# Patient Record
Sex: FEMALE | Race: WHITE
[De-identification: names, ages, dates, MRNs, and addresses within clinical notes are randomized per-mention and may not be internally consistent; named-entity substitution may affect disease eponyms.]

---

## 2018-02-07 ENCOUNTER — HOSPITAL ENCOUNTER (EMERGENCY)
Dept: HOSPITAL 75 - ER | Age: 33
Discharge: HOME | End: 2018-02-07
Payer: SELF-PAY

## 2018-02-07 VITALS — HEIGHT: 61 IN | BODY MASS INDEX: 33.04 KG/M2 | WEIGHT: 175 LBS

## 2018-02-07 VITALS — DIASTOLIC BLOOD PRESSURE: 80 MMHG | SYSTOLIC BLOOD PRESSURE: 124 MMHG

## 2018-02-07 DIAGNOSIS — Z87.442: ICD-10-CM

## 2018-02-07 DIAGNOSIS — H53.123: ICD-10-CM

## 2018-02-07 DIAGNOSIS — D64.9: ICD-10-CM

## 2018-02-07 DIAGNOSIS — Z88.1: ICD-10-CM

## 2018-02-07 DIAGNOSIS — R51: Primary | ICD-10-CM

## 2018-02-07 DIAGNOSIS — Z79.84: ICD-10-CM

## 2018-02-07 DIAGNOSIS — Z87.448: ICD-10-CM

## 2018-02-07 LAB
ALBUMIN SERPL-MCNC: 3.6 GM/DL (ref 3.2–4.5)
ALP SERPL-CCNC: 69 U/L (ref 40–136)
ALT SERPL-CCNC: 14 U/L (ref 0–55)
APTT PPP: YELLOW S
BACTERIA #/AREA URNS HPF: NEGATIVE /HPF
BARBITURATES UR QL: NEGATIVE
BASOPHILS # BLD AUTO: 0 10^3/UL (ref 0–0.1)
BASOPHILS NFR BLD AUTO: 0 % (ref 0–10)
BENZODIAZ UR QL SCN: NEGATIVE
BILIRUB SERPL-MCNC: 0.2 MG/DL (ref 0.1–1)
BILIRUB UR QL STRIP: NEGATIVE
BUN/CREAT SERPL: 20
CALCIUM SERPL-MCNC: 8.4 MG/DL (ref 8.5–10.1)
CHLORIDE SERPL-SCNC: 108 MMOL/L (ref 98–107)
CO2 SERPL-SCNC: 18 MMOL/L (ref 21–32)
COCAINE UR QL: NEGATIVE
CREAT SERPL-MCNC: 0.69 MG/DL (ref 0.6–1.3)
EOSINOPHIL # BLD AUTO: 0.1 10^3/UL (ref 0–0.3)
EOSINOPHIL NFR BLD AUTO: 1 % (ref 0–10)
ERYTHROCYTE [DISTWIDTH] IN BLOOD BY AUTOMATED COUNT: 14.2 % (ref 10–14.5)
ERYTHROCYTE [SEDIMENTATION RATE] IN BLOOD: 36 MM/HR (ref 0–20)
FIBRINOGEN PPP-MCNC: (no result) MG/DL
GFR SERPLBLD BASED ON 1.73 SQ M-ARVRAT: > 60 ML/MIN
GLUCOSE SERPL-MCNC: 113 MG/DL (ref 70–105)
GLUCOSE UR STRIP-MCNC: NEGATIVE MG/DL
HCT VFR BLD CALC: 34 % (ref 35–52)
HGB BLD-MCNC: 11.1 G/DL (ref 11.5–16)
KETONES UR QL STRIP: NEGATIVE
LEUKOCYTE ESTERASE UR QL STRIP: (no result)
LYMPHOCYTES # BLD AUTO: 1.3 X 10^3 (ref 1–4)
LYMPHOCYTES NFR BLD AUTO: 15 % (ref 12–44)
MANUAL DIFFERENTIAL PERFORMED BLD QL: NO
MCH RBC QN AUTO: 27 PG (ref 25–34)
MCHC RBC AUTO-ENTMCNC: 33 G/DL (ref 32–36)
MCV RBC AUTO: 82 FL (ref 80–99)
METHADONE UR QL SCN: NEGATIVE
METHAMPHETAMINE SCREEN URINE S: NEGATIVE
MONOCYTES # BLD AUTO: 0.2 X 10^3 (ref 0–1)
MONOCYTES NFR BLD AUTO: 2 % (ref 0–12)
NEUTROPHILS # BLD AUTO: 7.2 X 10^3 (ref 1.8–7.8)
NEUTROPHILS NFR BLD AUTO: 82 % (ref 42–75)
NITRITE UR QL STRIP: NEGATIVE
OPIATES UR QL SCN: NEGATIVE
OXYCODONE UR QL: NEGATIVE
PH UR STRIP: 6.5 [PH] (ref 5–9)
PLATELET # BLD: 259 10^3/UL (ref 130–400)
PMV BLD AUTO: 11.1 FL (ref 7.4–10.4)
POTASSIUM SERPL-SCNC: 3.8 MMOL/L (ref 3.6–5)
PROPOXYPH UR QL: NEGATIVE
PROT SERPL-MCNC: 7.2 GM/DL (ref 6.4–8.2)
PROT UR QL STRIP: (no result)
RBC # BLD AUTO: 4.09 10^6/UL (ref 4.35–5.85)
RBC #/AREA URNS HPF: >100 /HPF
SODIUM SERPL-SCNC: 140 MMOL/L (ref 135–145)
SP GR UR STRIP: 1.02 (ref 1.02–1.02)
SQUAMOUS #/AREA URNS HPF: (no result) /HPF
TRICYCLICS UR QL SCN: NEGATIVE
UROBILINOGEN UR-MCNC: 1 MG/DL
WBC # BLD AUTO: 8.9 10^3/UL (ref 4.3–11)
WBC #/AREA URNS HPF: (no result) /HPF

## 2018-02-07 PROCEDURE — 36415 COLL VENOUS BLD VENIPUNCTURE: CPT

## 2018-02-07 PROCEDURE — 85652 RBC SED RATE AUTOMATED: CPT

## 2018-02-07 PROCEDURE — 80320 DRUG SCREEN QUANTALCOHOLS: CPT

## 2018-02-07 PROCEDURE — 80053 COMPREHEN METABOLIC PANEL: CPT

## 2018-02-07 PROCEDURE — 70450 CT HEAD/BRAIN W/O DYE: CPT

## 2018-02-07 PROCEDURE — 80306 DRUG TEST PRSMV INSTRMNT: CPT

## 2018-02-07 PROCEDURE — 85025 COMPLETE CBC W/AUTO DIFF WBC: CPT

## 2018-02-07 PROCEDURE — 82962 GLUCOSE BLOOD TEST: CPT

## 2018-02-07 PROCEDURE — 81000 URINALYSIS NONAUTO W/SCOPE: CPT

## 2018-02-07 PROCEDURE — 84703 CHORIONIC GONADOTROPIN ASSAY: CPT

## 2018-02-07 PROCEDURE — 86141 C-REACTIVE PROTEIN HS: CPT

## 2018-02-07 NOTE — ED HEADACHE
General


Chief Complaint:  Head/Cervical Problems


Stated Complaint:  LOSS OF VISION, COLD, SHAKING - PRE DIABETIC


Nursing Triage Note:  


ARRIVED VIA AMB TO ROOM 04.  STATES APPX 30 MINUTES AGO HER VISION TURNED TO 


WHITE FOR APPX 5-7 MINUTES AND THEN SHE DEVELOPED A HEADACHE.


Nursing Sepsis Screen:  No Definite Risk


Source:  patient, family


Exam Limitations:  language barrier (speak Chinese. Interpretation by family 

member.)





History of Present Illness


Date Seen by Provider:  2018


Time Seen by Provider:  16:23


Initial Comments


Patient presents to ER by private conveyance with chief complaint that about an 

hour prior to arrival she had a 15 minute long severe headache that came on 

suddenly started in her left face behind eye and radiated around the parietal 

scalp to the occiput. She says her vision began to go white and all she could 

see was just shadows and this was in both eyes she was practically blind. She 

was trying to drive home after taking her kids up from school and had to pull 

over until someone came to help her. About 15 minutes later without any 

medicines it went away on its own. She does have a history of migraines that 

are typically frontal and behind both eyes throbbing, constant and will go away 

after a couple doses of Excedrin migraine. She had one other episode of this 

about a month ago that she presented to her primary care physician's office for 

and they told her that she needed to go to the ER but as she left the clinic 

the headache went away in about 15-30 minutes so she did not pursue ER 

management. She did not follow-up on this either. She has not had any cough, 

fevers, chills, dysuria, nausea, chest pain, shortness of breath.





Allergies and Home Medications


Allergies


Coded Allergies:  


     NKANo Known Allergies (Verified  Allergy, Unknown, 06)





Home Medications


Metformin HCl 500 Mg Tablet, (Reported)


Norgestimate-Ethinyl Estradiol 1 Each Tablet, (Reported)





Constitutional:  No chills, No diaphoresis


Eyes:  See HPI, Blindness, Blurred Vision, Denies Drainage, Denies Foreign Body 

Sensation, Denies Pain, Denies Photophobia


Ears, Nose, Mouth, Throat:  denies ear pain, denies ear discharge


Respiratory:  No cough, No phlegm, No short of breath


Cardiovascular:  No Hx of Intervention, No palpitations


Gastrointestinal:  No constipation, No diarrhea, nausea (initially)


Genitourinary:  No discharge, No dysuria


Pregnant:  No (on her period presently)


LMP:  2018


Musculoskeletal:  No back pain, No joint pain





Past Medical-Social-Family Hx


Patient Social History


Alcohol Use:  Denies Use


Recreational Drug Use:  No


Smoking Status:  Never a Smoker


Recent Foreign Travel:  No


Contact w/Someone Who Travel:  No


Recent Infectious Disease Expo:  No


Recent Hopitalizations:  Yes





Immunizations Up To Date


Tetanus Booster (TDap):  Less than 5yrs


Date of Influenza Vaccine:  Dec 5, 2013





Surgeries


History of Surgeries:  No





Respiratory


History of Respiratory Disorde:  No





Cardiovascular


History of Cardiac Disorders:  No





Neurological


History of Neurological Disord:  No





Reproductive System


Hx Reproductive Disorders:  No


Sexually Transmitted Disease:  No


HIV/AIDS:  No


Female Reproductive Disorders:  Denies





Genitourinary


History of Genitourinary Disor:  Yes


Genitourinary Disorders:  Kidney Infection, Kidney Stones





Gastrointestinal


History of Gastrointestinal Di:  No





Musculoskeletal


History of Musculoskeletal Dis:  No





Endocrine


History of Endocrine Disorders:  Yes (PRE DIABETIC)





HEENT


Loss of Vision:  Denies


Hearing Impairment:  Denies





Cancer


History of Cancer:  No





Psychosocial


History of Psychiatric Problem:  No





Integumentary


History of Skin or Integumenta:  No





Blood Transfusions


History of Blood Disorders:  Yes (ANEMIA WITH Previous pregnancy)


Adverse Reaction to a Blood Tr:  No





Family Medical History


Significant Family History:  No Pertinent Family Hx





Physical Exam


Vital Signs





Vital Signs - First Documented








 18





 15:49


 


Temp 97.5


 


Pulse 97


 


Resp 18


 


B/P (MAP) 114/79 (91)


 


Pulse Ox 99





Capillary Refill : Less Than 3 Seconds


General Appearance:  WD/WN, no apparent distress


HEENT:  PERRL/EOMI, TMs normal, pharynx normal


Neck:  non-tender, full range of motion, supple, normal inspection


Cardiovascular:  normal peripheral pulses, regular rate, rhythm, no edema


Respiratory:  chest non-tender, lungs clear, normal breath sounds


Gastrointestinal:  normal bowel sounds, non tender, soft


Back:  normal inspection, no vertebral tenderness


Extremities:  normal inspection, no pedal edema, normal capillary refill


Psychiatric:  alert, oriented x 3


Crainal Nerves:  normal hearing, normal speech, PERRL


Coordination/Gait:  normal finger to nose, normal gait


Motor/Sensory:  no motor deficit, no sensory deficit, no pronator drift


Skin:  normal color, warm/dry


Lymphatic:  no adenopathy





Progress/Results/Core Measures


Results/Orders


Lab Results





Laboratory Tests








Test


  18


15:59 18


16:30 18


16:41 Range/Units


 


 


Glucometer 107      MG/DL


 


Urine Color  YELLOW    


 


Urine Clarity  VERY CLOUDY H   


 


Urine pH  6.5   5-9  


 


Urine Specific Gravity  1.020   1.016-1.022  


 


Urine Protein  3+ H  NEGATIVE  


 


Urine Glucose (UA)  NEGATIVE   NEGATIVE  


 


Urine Ketones  NEGATIVE   NEGATIVE  


 


Urine Nitrite  NEGATIVE   NEGATIVE  


 


Urine Bilirubin  NEGATIVE   NEGATIVE  


 


Urine Urobilinogen  1   NORMAL  MG/DL


 


Urine Leukocyte Esterase  1+ H  NEGATIVE  


 


Urine RBC (Auto)  5+ H  NEGATIVE  


 


Urine RBC  >100 H   /HPF


 


Urine WBC  0-2    /HPF


 


Urine Squamous Epithelial


Cells 


  0-2 


  


   /HPF


 


 


Urine Crystals  NONE    /LPF


 


Urine Bacteria  NEGATIVE    /HPF


 


Urine Casts  NONE    /LPF


 


Urine Mucus  SMALL H   /LPF


 


Urine Culture Indicated  NO    


 


Urine Opiates Screen  NEGATIVE   NEGATIVE  


 


Urine Oxycodone Screen  NEGATIVE   NEGATIVE  


 


Urine Methadone Screen  NEGATIVE   NEGATIVE  


 


Urine Propoxyphene Screen  NEGATIVE   NEGATIVE  


 


Urine Barbiturates Screen  NEGATIVE   NEGATIVE  


 


Ur Tricyclic Antidepressants


Screen 


  NEGATIVE 


  


  NEGATIVE  


 


 


Urine Phencyclidine Screen  NEGATIVE   NEGATIVE  


 


Urine Amphetamines Screen  NEGATIVE   NEGATIVE  


 


Urine Methamphetamines Screen  NEGATIVE   NEGATIVE  


 


Urine Benzodiazepines Screen  NEGATIVE   NEGATIVE  


 


Urine Cocaine Screen  NEGATIVE   NEGATIVE  


 


Urine Cannabinoids Screen  NEGATIVE   NEGATIVE  


 


White Blood Count


  


  


  8.9 


  4.3-11.0


10^3/uL


 


Red Blood Count


  


  


  4.09 L


  4.35-5.85


10^6/uL


 


Hemoglobin   11.1 L 11.5-16.0  G/DL


 


Hematocrit   34 L 35-52  %


 


Mean Corpuscular Volume   82  80-99  FL


 


Mean Corpuscular Hemoglobin   27  25-34  PG


 


Mean Corpuscular Hemoglobin


Concent 


  


  33 


  32-36  G/DL


 


 


Red Cell Distribution Width   14.2  10.0-14.5  %


 


Platelet Count


  


  


  259 


  130-400


10^3/uL


 


Mean Platelet Volume   11.1 H 7.4-10.4  FL


 


Neutrophils (%) (Auto)   82 H 42-75  %


 


Lymphocytes (%) (Auto)   15  12-44  %


 


Monocytes (%) (Auto)   2  0-12  %


 


Eosinophils (%) (Auto)   1  0-10  %


 


Basophils (%) (Auto)   0  0-10  %


 


Neutrophils # (Auto)   7.2  1.8-7.8  X 10^3


 


Lymphocytes # (Auto)   1.3  1.0-4.0  X 10^3


 


Monocytes # (Auto)   0.2  0.0-1.0  X 10^3


 


Eosinophils # (Auto)


  


  


  0.1 


  0.0-0.3


10^3/uL


 


Basophils # (Auto)


  


  


  0.0 


  0.0-0.1


10^3/uL


 


Sodium Level   140  135-145  MMOL/L


 


Potassium Level   3.8  3.6-5.0  MMOL/L


 


Chloride Level   108 H   MMOL/L


 


Carbon Dioxide Level   18 L 21-32  MMOL/L


 


Anion Gap   14  5-14  MMOL/L


 


Blood Urea Nitrogen   14  7-18  MG/DL


 


Creatinine


  


  


  0.69 


  0.60-1.30


MG/DL


 


Estimat Glomerular Filtration


Rate 


  


  > 60 


   


 


 


BUN/Creatinine Ratio   20   


 


Glucose Level   113 H   MG/DL


 


Calcium Level   8.4 L 8.5-10.1  MG/DL


 


Total Bilirubin   0.2  0.1-1.0  MG/DL


 


Aspartate Amino Transf


(AST/SGOT) 


  


  18 


  5-34  U/L


 


 


Alanine Aminotransferase


(ALT/SGPT) 


  


  14 


  0-55  U/L


 


 


Alkaline Phosphatase   69    U/L


 


C-Reactive Protein High


Sensitivity 


  


  0.96 H


  0.00-0.50


MG/DL


 


Total Protein   7.2  6.4-8.2  GM/DL


 


Albumin   3.6  3.2-4.5  GM/DL








My Orders





Orders - ITALO BECKMAN


Ct Head Wo (18 16:30)


Saline Lock/Iv-Start (18 16:30)


Alcohol (18 16:30)


Cbc With Automated Diff (18 16:30)


Comprehensive Metabolic Panel (18 16:30)


Hs C Reactive Protein (18 16:30)


Drug Screen Stat (Urine) (18 16:30)


Ua Culture If Indicated (18 16:30)


Erythrocyte Sedimentation Rate (18 16:30)





Vital Signs/I&O





Vital Sign - Last 12Hours








 18





 15:49


 


Temp 97.5


 


Pulse 97


 


Resp 18


 


B/P (MAP) 114/79 (91)


 


Pulse Ox 99














Blood Pressure Mean:  91








Progress Note #1:  


   Time:  16:42


Progress Note


No headaches or other neurologic symptoms now but this is a change in her 

headache pattern. Plan to scan her head with a CT noncontrast. Discussed with 

radiologist in [place] for optimal imaging study and they recommended a CT 

noncontrast and if further workup is necessary an MRI would be appropriate. We'

ll get some blood and urine looking for any evidence of inflammation to include 

a CRP/ESR. If we don't find anything to act on today she will need to follow-up 

with her primary care physician for further workup. GCA?


Progress Note #2:  


   Time:  17:15


Progress Note


Other than a mild normochromic normocytic anemia the patient has unremarkable 

labs and urine. Red blood cells seen are due to her being on her period. CT is 

unremarkable. She can follow up outpatient for further evaluation to possibly 

include MRI for a atypical migraine presentation.





Diagnostic Imaging





   Diagonstic Imaging:  CT


   Plain Films/CT/US/NM/MRI:  head (noncontrast)


Comments


 VIA [facility], [facility].


 [place], [place]





NAME:   BEVERLY SAWYER


MED REC#:   [id number]


ACCOUNT#:   [id number]


PT STATUS:   REG ER


:   1985


PHYSICIAN:   ITALO BECKMAN MD


ADMIT DATE:   18/ER


 ***Draft***


Date of Exam:18





CT HEAD WO








Clinical indication: A patient with headaches and loss of vision.





Exam: Axial CT scan of the brain performed without IV contrast.





Comparison: None.





Findings:


There is no evidence of acute cerebral infarct, intracranial


hemorrhage, or gross mass effect. 





There is normal gray-white matter distinction. The brain


parenchymal volume appears appropriate for patient's age. There


is no significant midline shift or herniation. 





 There is no evidence of hydrocephalus. The basal cisterns are


unremarkable. The skull, extracranial soft tissue, and orbits are


unremarkable. The paranasal sinuses are unremarkable.





Impression:


Unremarkable CT scan of the brain.





  Dictated on workstation # [id number]








Dict:   18 1652


Trans:   18 1655


[facility] [id number]





Interpreted by:     LISBETH MANN MD


Electronically signed by:


   Reviewed:  Reviewed by Me





Departure


Impression


Impression:  


 Primary Impression:  


 Headache


 Qualified Codes:  R51 - Headache


 Additional Impressions:  


 Transient blindness of both eyes


 Normocytic anemia


Disposition:  01 HOME, SELF-CARE


Condition:  Stable





Departure-Patient Inst.


Decision time for Depature:  17:34


Referrals:  


[facility] (PCP/Family)


Primary Care Physician


Patient Instructions:  Migraine Headache (DC)





Add. Discharge Instructions:  


Your symptoms may be related to your migraine headache pattern. If the pattern 

changes and is accompanied by slurred speech, facial droop, inability to walk 

or weakness you should return to the ER immediately. However if your symptoms 

remain the same please start a diary of your headache symptoms to include 

things are doing prior to the headache as well as along the headache lasted and 

what you used to break the headache. Plan to follow up with your primary care 

physician in the next 1-2 weeks. You may benefit from a referral to a 

neurologist if initial workup with your primary care physician is not fruitful. 

All discharge instructions reviewed with patient and/or family. Voiced 

understanding.





Copy


Copies To 1:   NEETA KNOWLES TITUS J 2018 16:41

## 2018-02-07 NOTE — DIAGNOSTIC IMAGING REPORT
Clinical indication: A patient with headaches and loss of vision.



Exam: Axial CT scan of the brain performed without IV contrast.



Comparison: None.



Findings:

There is no evidence of acute cerebral infarct, intracranial

hemorrhage, or gross mass effect. 



There is normal gray-white matter distinction. The brain

parenchymal volume appears appropriate for patient's age. There

is no significant midline shift or herniation. 



 There is no evidence of hydrocephalus. The basal cisterns are

unremarkable. The skull, extracranial soft tissue, and orbits are

unremarkable. The paranasal sinuses are unremarkable.



Impression:

Unremarkable CT scan of the brain.



Dictated by: 



  Dictated on workstation # [id number]

## 2018-07-19 ENCOUNTER — HOSPITAL ENCOUNTER (OUTPATIENT)
Dept: HOSPITAL 75 - RAD | Age: 33
End: 2018-07-19
Attending: PHYSICIAN ASSISTANT
Payer: COMMERCIAL

## 2018-07-19 ENCOUNTER — HOSPITAL ENCOUNTER (OUTPATIENT)
Dept: HOSPITAL 75 - SDC | Age: 33
LOS: 1 days | Discharge: HOME | End: 2018-07-20
Attending: SURGERY
Payer: COMMERCIAL

## 2018-07-19 VITALS
DIASTOLIC BLOOD PRESSURE: 66 MMHG | BODY MASS INDEX: 33.99 KG/M2 | HEIGHT: 61 IN | SYSTOLIC BLOOD PRESSURE: 108 MMHG | WEIGHT: 180 LBS

## 2018-07-19 VITALS — SYSTOLIC BLOOD PRESSURE: 104 MMHG | DIASTOLIC BLOOD PRESSURE: 59 MMHG

## 2018-07-19 DIAGNOSIS — K35.80: Primary | ICD-10-CM

## 2018-07-19 DIAGNOSIS — K38.1: Primary | ICD-10-CM

## 2018-07-19 DIAGNOSIS — Z79.84: ICD-10-CM

## 2018-07-19 DIAGNOSIS — E11.9: ICD-10-CM

## 2018-07-19 LAB
BASOPHILS # BLD AUTO: 0 10^3/UL (ref 0–0.1)
BASOPHILS NFR BLD AUTO: 0 % (ref 0–10)
BUN/CREAT SERPL: 15
CALCIUM SERPL-MCNC: 9.4 MG/DL (ref 8.5–10.1)
CHLORIDE SERPL-SCNC: 109 MMOL/L (ref 98–107)
CO2 SERPL-SCNC: 21 MMOL/L (ref 21–32)
CREAT SERPL-MCNC: 0.65 MG/DL (ref 0.6–1.3)
EOSINOPHIL # BLD AUTO: 0.2 10^3/UL (ref 0–0.3)
EOSINOPHIL NFR BLD AUTO: 2 % (ref 0–10)
ERYTHROCYTE [DISTWIDTH] IN BLOOD BY AUTOMATED COUNT: 14.3 % (ref 10–14.5)
GFR SERPLBLD BASED ON 1.73 SQ M-ARVRAT: > 60 ML/MIN
GLUCOSE SERPL-MCNC: 106 MG/DL (ref 70–105)
HCT VFR BLD CALC: 34 % (ref 35–52)
HGB BLD-MCNC: 11.1 G/DL (ref 11.5–16)
LYMPHOCYTES # BLD AUTO: 2 X 10^3 (ref 1–4)
LYMPHOCYTES NFR BLD AUTO: 16 % (ref 12–44)
MANUAL DIFFERENTIAL PERFORMED BLD QL: NO
MCH RBC QN AUTO: 27 PG (ref 25–34)
MCHC RBC AUTO-ENTMCNC: 33 G/DL (ref 32–36)
MCV RBC AUTO: 81 FL (ref 80–99)
MONOCYTES # BLD AUTO: 0.7 X 10^3 (ref 0–1)
MONOCYTES NFR BLD AUTO: 6 % (ref 0–12)
NEUTROPHILS # BLD AUTO: 9.2 X 10^3 (ref 1.8–7.8)
NEUTROPHILS NFR BLD AUTO: 76 % (ref 42–75)
PLATELET # BLD: 297 10^3/UL (ref 130–400)
PMV BLD AUTO: 10.8 FL (ref 7.4–10.4)
POTASSIUM SERPL-SCNC: 4 MMOL/L (ref 3.6–5)
RBC # BLD AUTO: 4.15 10^6/UL (ref 4.35–5.85)
SODIUM SERPL-SCNC: 139 MMOL/L (ref 135–145)
WBC # BLD AUTO: 12.2 10^3/UL (ref 4.3–11)

## 2018-07-19 PROCEDURE — 74177 CT ABD & PELVIS W/CONTRAST: CPT

## 2018-07-19 PROCEDURE — 36415 COLL VENOUS BLD VENIPUNCTURE: CPT

## 2018-07-19 PROCEDURE — 85025 COMPLETE CBC W/AUTO DIFF WBC: CPT

## 2018-07-19 PROCEDURE — 87081 CULTURE SCREEN ONLY: CPT

## 2018-07-19 PROCEDURE — 83036 HEMOGLOBIN GLYCOSYLATED A1C: CPT

## 2018-07-19 PROCEDURE — 94664 DEMO&/EVAL PT USE INHALER: CPT

## 2018-07-19 PROCEDURE — 80048 BASIC METABOLIC PNL TOTAL CA: CPT

## 2018-07-19 PROCEDURE — 84703 CHORIONIC GONADOTROPIN ASSAY: CPT

## 2018-07-19 RX ADMIN — METRONIDAZOLE SCH MLS/HR: 5 INJECTION, SOLUTION INTRAVENOUS at 18:51

## 2018-07-19 RX ADMIN — CIPROFLOXACIN SCH MLS/HR: 2 INJECTION, SOLUTION INTRAVENOUS at 18:51

## 2018-07-19 RX ADMIN — SODIUM CHLORIDE, SODIUM LACTATE, POTASSIUM CHLORIDE, AND CALCIUM CHLORIDE SCH MLS/HR: 600; 310; 30; 20 INJECTION, SOLUTION INTRAVENOUS at 18:51

## 2018-07-19 NOTE — DIAGNOSTIC IMAGING REPORT
PROCEDURE: CT abdomen and pelvis with contrast, rule out

appendicitis.



TECHNIQUE: Multiple contiguous axial images were obtained through

the abdomen and pelvis after the administration of intravenous

contrast.



INDICATION: Right lower quadrant pain.



COMPARISON: Comparison is made with prior CT from 02/05/2014.



FINDINGS: The lung bases are clear. No discrete liver mass is

detected. The gallbladder is unremarkable. The pancreas and

spleen are unremarkable. No adrenal mass is identified. The

kidneys are unremarkable. Aorta is non-aneurysmal. There is

moderate inflammatory changes identified in the right lower

quadrant. The appendix is dilated and inflamed. There appears to

be an appendicolith within the appendix. No abscess formation or

bowel obstruction is seen. There is moderate stool throughout the

colon. No free fluid is seen. The bladder and uterus are

unremarkable.



IMPRESSION: Findings consistent with acute appendicitis. There is

an appendicolith located within a dilated appendix. No abscess

formation or bowel obstruction is seen.











Dictated by: 



  Dictated on workstation # [id number]

## 2018-07-19 NOTE — PROGRESS NOTE-PRE OPERATIVE
Pre-Operative Progress Note


H&P Reviewed


The H&P was reviewed, patient examined and no changes noted.


Date Seen by Provider:  Jul 19, 2018


Time Seen by Provider:  20:00


Date H&P Reviewed:  Jul 19, 2018


Time H&P Reviewed:  20:00


Pre-Operative Diagnosis:  acute appendicitis











RANDALL NAVA MD Jul 19, 2018 20:01

## 2018-07-19 NOTE — XMS REPORT
Continuity of Care Document

 Created on: 2018



ELBA WILLSON, BEVERLY

External Reference #: [id number]

: 1985

Sex: Female



Demographics







 Address  [address] [place]  [address]

 

 Home Phone  [phone] x

 

 Preferred Language  Unknown

 

 Marital Status  Unknown

 

 Confucianist Affiliation  Unknown

 

 Race  Unknown

 

 Ethnic Group  Unknown





Author







 Author  [facility]

 

 Organization  [facility]

 

 Address  Unknown

 

 Phone  Unavailable



              



Allergies

      





 Active            Description            Code            Type            
Severity            Reaction            Onset            Reported/Identified   
         Relationship to Patient            Clinical Status        

 

 Yes            NKANo Known Allergies            NKA            Miscellaneous 
Allergy            Unknown            N/A                         2006   
                               



                  



Medications

      



There is no data.                  



Problems

      





 Date Dx Coded            Attending            Type            Code            
Diagnosis            Diagnosed By        

 

 2013            DEANA ALMEIDA MD            Ot            650          
  NORMAL DELIVERY                     

 

 2013            DEANA ALMEIDA MD            Ot            V27.0        
    DELIVER-SINGLE LIVEBORN                     

 

 2013            SHELLI PHIPPS            Ot            592.0    
        CALCULUS OF KIDNEY                     

 

 2013            SHELLI PHIPPS            Ot            789.09   
         ABDOMINAL PAIN, OTHER SPECIFIED SITE                     

 

 2014            DEANA ALMEIDA MD            Ot            592.0        
    CALCULUS OF KIDNEY                     

 

 2014            DEANA ALMEIDA MD            Ot            592.1        
    CALCULUS OF URETER                     

 

 2018            ITALO BECKMAN MD            Ot            D64.9       
     ANEMIA, UNSPECIFIED                     

 

 2018            ITALO BECKMAN MD            Ot            H53.123     
       TRANSIENT VISUAL LOSS, BILATERAL                     

 

 2018            ITALO BECKMAN MD            Ot            R51         
   HEADACHE                     

 

 2018            ITALO BECKMAN MD            Ot            Z79.84      
      LONG TERM (CURRENT) USE OF ORAL HYPOGLYC                     

 

 2018            ITALO BECKMAN MD            Ot            Z87.442     
       PERSONAL HISTORY OF URINARY CALCULI                     

 

 2018            ITALO BECKMAN MD            Ot            Z87.448     
       PERSONAL HISTORY OF OTHER DISEASES OF UR                     

 

 2018            ITALO BECKMAN MD            Ot            Z88.1       
     ALLERGY STATUS TO OTHER ANTIBIOTIC AGENT                     

 

 2018                         Ot            285.9            ANEMIA NOS  
                   

 

 2018                         Ot            648.23            ANEMIA-
ANTEPARTUM                     

 

 2018                         Ot            649.63            UTERINE 
SIZE DATE DISCREPANCY, ANTEPARTU                     

 

 2018            DEANA ALMEIDA MD            Ot            649.63       
     UTERINE SIZE DATE DISCREPANCY, ANTEPARTU                     

 

 2018            DEANA ALMEIDA MD            Ot            V28.89       
     OTHER SPECIFIED  SCREENING                     

 

 2018            DEANA ALMEIDA MD            Ot            592.0        
    CALCULUS OF KIDNEY                     

 

 2018            DEANA ALMEIDA MD            Ot            789.09       
     ABDOMINAL PAIN, OTHER SPECIFIED SITE                     

 

 2018            DEANA ALMEIDA MD            Ot            620.2        
    OVARIAN CYST NEC/NOS                     

 

 2018            DEANA ALMEIDA MD            Ot            625.9        
    FEM GENITAL SYMPTOMS NOS                     

 

 2018                         Ot            285.9            ANEMIA NOS  
                   

 

 2018                         Ot            648.23            ANEMIA-
ANTEPARTUM                     

 

 2018                         Ot            649.63            UTERINE 
SIZE DATE DISCREPANCY, ANTEPARTU                     

 

 2018            DEANA ALMEIDA MD            Ot            649.63       
     UTERINE SIZE DATE DISCREPANCY, ANTEPARTU                     

 

 2018            DEANA ALMEIDA MD            Ot            V28.89       
     OTHER SPECIFIED  SCREENING                     

 

 2018            DEANA ALMEIDA MD            Ot            592.0        
    CALCULUS OF KIDNEY                     

 

 2018            DEANA ALMEIDA MD            Ot            789.09       
     ABDOMINAL PAIN, OTHER SPECIFIED SITE                     

 

 2018            DEANA ALMEIDA MD            Ot            620.2        
    OVARIAN CYST NEC/NOS                     

 

 2018            DEANA ALMEIDA MD            Ot            625.9        
    FEM GENITAL SYMPTOMS NOS                     

 

 2018            ITALO BECKMAN MD            Ot            D64.9       
     ANEMIA, UNSPECIFIED                     

 

 2018            KARUNA VUONG, ITALO JESUS            Ot            H53.123     
       TRANSIENT VISUAL LOSS, BILATERAL                     

 

 2018            ITALO BECKMAN MD            Ot            R51         
   HEADACHE                     

 

 2018            ITALO BECKMAN MD            Ot            Z79.84      
      LONG TERM (CURRENT) USE OF ORAL HYPOGLYC                     

 

 2018            ITALO BECKMAN MD            Ot            Z87.442     
       PERSONAL HISTORY OF URINARY CALCULI                     

 

 2018            ITALO BECKMAN MD            Ot            Z87.448     
       PERSONAL HISTORY OF OTHER DISEASES OF UR                     

 

 2018            ITALO BECKMAN MD            Ot            Z88.1       
     ALLERGY STATUS TO OTHER ANTIBIOTIC AGENT                     

 

 2018                         Ot            649.63            UTERINE 
SIZE DATE DISCREPANCY, ANTEPARTU                     

 

 2018            DEANA ALMEIDA MD            Ot            649.63       
     UTERINE SIZE DATE DISCREPANCY, ANTEPARTU                     

 

 2018            DEANA ALMEIDA MD            Ot            V28.89       
     OTHER SPECIFIED  SCREENING                     

 

 2018            DEANA ALMEIDA MD            Ot            592.0        
    CALCULUS OF KIDNEY                     

 

 2018            DEANA ALMEIDA MD            Ot            789.09       
     ABDOMINAL PAIN, OTHER SPECIFIED SITE                     

 

 2018            ELLE VUONG, DEANA JESUS            Ot            620.2        
    OVARIAN CYST NEC/NOS                     

 

 2018            DEANA ALMEIDA MD            Ot            625.9        
    FEM GENITAL SYMPTOMS NOS                     



                                                                               
                           



Procedures

      





 Code            Description            Performed By            Performed On   
     

 

             73.4                                  [facility]     
                              2013        

 

             73.59                                  MANUAL ASSIST DELIV NEC    
                               2013        



                    



Results

      





 Test            Result            Range        









 CBC With Differential/Platelet - 16 07:58         









 WBC            6.3 x10E3/uL            3.4-10.8        

 

 RBC            4.40 x10E6/uL            3.77-5.28        

 

 Hemoglobin            10.7 g/dL            11.1-15.9        

 

 Hematocrit            33.6 %            34.0-46.6        

 

 MCV            76 fL            79-97        

 

 MCH            24.3 pg            26.6-33.0        

 

 MCHC            31.8 g/dL            31.5-35.7        

 

 RDW            16.4 %            12.3-15.4        

 

 Platelets            286 x10E3/uL            150-379        

 

 Neutrophils            70 %                     

 

 Lymphs            21 %                     

 

 Monocytes            7 %                     

 

 Eos            2 %                     

 

 Basos            0 %                     

 

 Neutrophils (Absolute)            4.3 x10E3/uL            1.4-7.0        

 

 Lymphs (Absolute)            1.3 x10E3/uL            0.7-3.1        

 

 Monocytes(Absolute)            0.4 x10E3/uL            0.1-0.9        

 

 Eos (Absolute)            0.2 x10E3/uL            0.0-0.4        

 

 Baso (Absolute)            0.0 x10E3/uL            0.0-0.2        

 

 Immature Granulocytes            0 %                     

 

 Immature Grans (Abs)            0.0 x10E3/uL            0.0-0.1        









 Comp. Metabolic Panel (14) - 16 07:58         









 Glucose, Serum            101 mg/dL            65-99        

 

 BUN            13 mg/dL            6-20        

 

 Creatinine, Serum            0.66 mg/dL            0.57-1.00        

 

 eGFR If NonAfricn Am            116 mL/min/1.73                >59        

 

 eGFR If Africn Am            134 mL/min/1.73                >59        

 

 BUN/Creatinine Ratio            20             8-20        

 

 Sodium, Serum            142 mmol/L            134-144        

 

 Potassium, Serum            4.8 mmol/L            3.5-5.2        

 

 Chloride, Serum            107 mmol/L                    

 

 Carbon Dioxide, Total            20 mmol/L            18-29        

 

 Calcium, Serum            8.9 mg/dL            8.7-10.2        

 

 Protein, Total, Serum            6.8 g/dL            6.0-8.5        

 

 Albumin, Serum            4.1 g/dL            3.5-5.5        

 

 Globulin, Total            2.7 g/dL            1.5-4.5        

 

 A/G Ratio            1.5             1.1-2.5        

 

 Bilirubin, Total            0.3 mg/dL            0.0-1.2        

 

 Alkaline Phosphatase, S            79 IU/L                    

 

 AST (SGOT)            15 IU/L            0-40        

 

 ALT (SGPT)            12 IU/L            0-32        









 Lipid Panel - 16 07:58         









 Cholesterol, Total            173 mg/dL            100-199        

 

 Triglycerides            76 mg/dL            0-149        

 

 HDL Cholesterol            51 mg/dL            >39        

 

 VLDL Cholesterol Lewis            15 mg/dL            5-40        

 

 LDL Cholesterol Calc            107 mg/dL            0-99        









 Hemoglobin A1c - 16 07:58         









 Hemoglobin A1c            5.8 %            4.8-5.6        









 Thyroid Cascade Profile - 16 07:58         









 TSH            1.510 uIU/mL            0.450-4.500        









 Insulin - 16 07:58         









 Insulin            25.8 uIU/mL            2.6-24.9        









 Genital Culture, Routine - 17 17:28         









 Genital Culture, Routine            Note                      









 Pap Lb, HPV-hr - 17 17:28         









 HPV, high-risk            Negative             Negative        

 

 DIAGNOSIS:            Comment                      

 

 Specimen adequacy:            Comment                      

 

 Clinician provided ICD10:            Comment                      

 

 Performed by:            Comment                      

 

 .            .                      

 

 Note:            Comment                      









 ANEMIA PANEL - 12/15/17 09:14         









 IRON, TOTAL            72 mcg/dL                    

 

 FERRITIN            7 ng/mL                    

 

 IRON BINDING CAPACITY            443 mcg/dL (calc)            250-450        

 

 % SATURATION            16 % (calc)            11-50        









 CBC - 12/15/17 09:14         









 MESSAGE:                         NRG        

 

 CONTAINER TYPE:            LAVENDER             NRG        









 THYROID ANALYZER - 18 08:12         









 TSH            1.55 mIU/L            NRG        









 INSULIN LEVEL - 18 12:34         









 INSULIN            7.9 uIU/mL             2.0-19.6        









 Capillary blood glucose measurement by glucometer (mass/volume) - 18 15:
59         









 Capillary blood glucose measurement by glucometer (mass/volume)            107 
mg/dL                    









 Urine drug screening test - 18 16:30         









 Urine phencyclidine detection by screening method            NEGATIVE         
    NEGATIVE        

 

 Urine benzodiazepines detection by screening method            NEGATIVE       
      NEGATIVE        

 

 Urine cocaine detection            NEGATIVE             NEGATIVE        

 

 Urine amphetamines detection by screening method            NEGATIVE          
   NEGATIVE        

 

 Urine methamphetamine detection by screening method            NEGATIVE       
      NEGATIVE        

 

 Urine cannabinoids detection by screening method            NEGATIVE          
   NEGATIVE        

 

 Urine opiates detection by screening method            NEGATIVE             
NEGATIVE        

 

 Urine barbiturates detection            NEGATIVE             NEGATIVE        

 

 Screening urine tricyclic antidepressants detection            NEGATIVE       
      NEGATIVE        

 

 Urine methadone detection by screening method            NEGATIVE             
NEGATIVE        

 

 Urine oxycodone detection            NEGATIVE             NEGATIVE        

 

 Urine propoxyphene detection            NEGATIVE             NEGATIVE        









 Complete urinalysis with reflex to culture - 18 16:30         









 Urine color determination            YELLOW             NRG        

 

 Urine clarity determination            VERY CLOUDY             NRG        

 

 Urine pH measurement by test strip            6.5             5-9        

 

 Specific gravity of urine by test strip            1.020             1.016-
1.022        

 

 Urine protein assay by test strip, semi-quantitative            3+             
NEGATIVE        

 

 Urine glucose detection by automated test strip            NEGATIVE           
  NEGATIVE        

 

 Erythrocytes detection in urine sediment by light microscopy            5+    
         NEGATIVE        

 

 Urine ketones detection by automated test strip            NEGATIVE           
  NEGATIVE        

 

 Urine nitrite detection by test strip            NEGATIVE             NEGATIVE
        

 

 Urine total bilirubin detection by test strip            NEGATIVE             
NEGATIVE        

 

 Urine urobilinogen measurement by automated test strip (mass/volume)          
  1 mg/dL            NORMAL        

 

 Urine leukocyte esterase detection by dipstick            1+             
NEGATIVE        

 

 Automated urine sediment erythrocyte count by microscopy (number/high power 
field)            > [HPF]            NRG        

 

 Automated urine sediment leukocyte count by microscopy (number/high power field
)             [HPF]            NRG        

 

 Bacteria detection in urine sediment by light microscopy            NEGATIVE  
           NRG        

 

 Squamous epithelial cells detection in urine sediment by light microscopy     
       0-2             NRG        

 

 Crystals detection in urine sediment by light microscopy            NONE      
       NRG        

 

 Casts detection in urine sediment by light microscopy            NONE         
    NRG        

 

 Mucus detection in urine sediment by light microscopy            SMALL        
     NRG        

 

 Complete urinalysis with reflex to culture            NO             NRG      
  









 Complete blood count (CBC) with automated white blood cell (WBC) differential 
- 18 16:41         









 Blood leukocytes automated count (number/volume)            8.9 10*3/uL       
     4.3-11.0        

 

 Blood erythrocytes automated count (number/volume)            4.09 10*6/uL    
        4.35-5.85        

 

 Venous blood hemoglobin measurement (mass/volume)            11.1 g/dL        
    11.5-16.0        

 

 Blood hematocrit (volume fraction)            34 %            35-52        

 

 Automated erythrocyte mean corpuscular volume            82 [foz_us]          
  80-99        

 

 Automated erythrocyte mean corpuscular hemoglobin (mass per erythrocyte)      
      27 pg            25-34        

 

 Automated erythrocyte mean corpuscular hemoglobin concentration measurement (
mass/volume)            33 g/dL            32-36        

 

 Automated erythrocyte distribution width ratio            14.2 %            
10.0-14.5        

 

 Automated blood platelet count (count/volume)            259 10*3/uL          
  130-400        

 

 Automated blood platelet mean volume measurement            11.1 [foz_us]     
       7.4-10.4        

 

 Automated blood neutrophils/100 leukocytes            82 %            42-75   
     

 

 Automated blood lymphocytes/100 leukocytes            15 %            12-44   
     

 

 Blood monocytes/100 leukocytes            2 %            0-12        

 

 Automated blood eosinophils/100 leukocytes            1 %            0-10     
   

 

 Automated blood basophils/100 leukocytes            0 %            0-10        

 

 Blood neutrophils automated count (number/volume)            7.2 10*3         
   1.8-7.8        

 

 Blood lymphocytes automated count (number/volume)            1.3 10*3         
   1.0-4.0        

 

 Blood monocytes automated count (number/volume)            0.2 10*3            
0.0-1.0        

 

 Automated eosinophil count            0.1 10*3/uL            0.0-0.3        

 

 Automated blood basophil count (count/volume)            0.0 10*3/uL          
  0.0-0.1        









 Comprehensive metabolic panel - 18 16:41         









 Serum or plasma sodium measurement (moles/volume)            140 mmol/L       
     135-145        

 

 Serum or plasma potassium measurement (moles/volume)            3.8 mmol/L    
        3.6-5.0        

 

 Serum or plasma chloride measurement (moles/volume)            108 mmol/L     
               

 

 Carbon dioxide            18 mmol/L            21-32        

 

 Serum or plasma anion gap determination (moles/volume)            14 mmol/L   
         5-14        

 

 Serum or plasma urea nitrogen measurement (mass/volume)            14 mg/dL   
         7-18        

 

 Serum or plasma creatinine measurement (mass/volume)            0.69 mg/dL    
        0.60-1.30        

 

 Serum or plasma urea nitrogen/creatinine mass ratio            20             
NRG        

 

 Serum or plasma creatinine measurement with calculation of estimated 
glomerular filtration rate            >             NRG        

 

 Serum or plasma glucose measurement (mass/volume)            113 mg/dL        
            

 

 Serum or plasma calcium measurement (mass/volume)            8.4 mg/dL        
    8.5-10.1        

 

 Serum or plasma total bilirubin measurement (mass/volume)            0.2 mg/dL
            0.1-1.0        

 

 Serum or plasma alkaline phosphatase measurement (enzymatic activity/volume)  
          69 U/L                    

 

 Serum or plasma aspartate aminotransferase measurement (enzymatic activity/
volume)            18 U/L            5-34        

 

 Serum or plasma alanine aminotransferase measurement (enzymatic activity/volume
)            14 U/L            0-55        

 

 Serum or plasma protein measurement (mass/volume)            7.2 g/dL         
   6.4-8.2        

 

 Serum or plasma albumin measurement (mass/volume)            3.6 g/dL         
   3.2-4.5        









 Serum or plasma C reactive protein measurement (mass/volume) - 18 16:41 
        









 Serum or plasma C reactive protein measurement (mass/volume)            0.96 mg
/dL            0.00-0.50        









 Erythrocyte sedimentation rate by westergren method - 18 16:41         









 Erythrocyte sedimentation rate by westergren method            36 mm          
  0-20        









 Serum or plasma ethanol measurement (mass/volume) - 18 16:41         









 Serum or plasma ethanol measurement (mass/volume)            < mg/dL          
  <10        









 CULTURE, GENITAL - 18 16:36         









 CULTURE, GENITAL            SEE NOTE             NRG        









 Complete blood count (CBC) with automated white blood cell (WBC) differential 
- 18 18:15         









 Blood leukocytes automated count (number/volume)            12.2 10*3/uL      
      4.3-11.0        

 

 Blood erythrocytes automated count (number/volume)            4.15 10*6/uL    
        4.35-5.85        

 

 Venous blood hemoglobin measurement (mass/volume)            11.1 g/dL        
    11.5-16.0        

 

 Blood hematocrit (volume fraction)            34 %            35-52        

 

 Automated erythrocyte mean corpuscular volume            81 [foz_us]          
  80-99        

 

 Automated erythrocyte mean corpuscular hemoglobin (mass per erythrocyte)      
      27 pg            25-34        

 

 Automated erythrocyte mean corpuscular hemoglobin concentration measurement (
mass/volume)            33 g/dL            32-36        

 

 Automated erythrocyte distribution width ratio            14.3 %            
10.0-14.5        

 

 Automated blood platelet count (count/volume)            297 10*3/uL          
  130-400        

 

 Automated blood platelet mean volume measurement            10.8 [foz_us]     
       7.4-10.4        

 

 Automated blood neutrophils/100 leukocytes            76 %            42-75   
     

 

 Automated blood lymphocytes/100 leukocytes            16 %            12-44   
     

 

 Blood monocytes/100 leukocytes            6 %            0-12        

 

 Automated blood eosinophils/100 leukocytes            2 %            0-10     
   

 

 Automated blood basophils/100 leukocytes            0 %            0-10        

 

 Blood neutrophils automated count (number/volume)            9.2 10*3         
   1.8-7.8        

 

 Blood lymphocytes automated count (number/volume)            2.0 10*3         
   1.0-4.0        

 

 Blood monocytes automated count (number/volume)            0.7 10*3            
0.0-1.0        

 

 Automated eosinophil count            0.2 10*3/uL            0.0-0.3        

 

 Automated blood basophil count (count/volume)            0.0 10*3/uL          
  0.0-0.1        









 Whole blood basic metabolic panel - 18 18:15         









 Serum or plasma sodium measurement (moles/volume)            139 mmol/L       
     135-145        

 

 Serum or plasma potassium measurement (moles/volume)            4.0 mmol/L    
        3.6-5.0        

 

 Serum or plasma chloride measurement (moles/volume)            109 mmol/L     
               

 

 Carbon dioxide            21 mmol/L            21-32        

 

 Serum or plasma anion gap determination (moles/volume)            9 mmol/L    
        5-14        

 

 Serum or plasma urea nitrogen measurement (mass/volume)            10 mg/dL   
         7-18        

 

 Serum or plasma creatinine measurement (mass/volume)            0.65 mg/dL    
        0.60-1.30        

 

 Serum or plasma urea nitrogen/creatinine mass ratio            15             
NRG        

 

 Serum or plasma creatinine measurement with calculation of estimated 
glomerular filtration rate            >             NRG        

 

 Serum or plasma glucose measurement (mass/volume)            106 mg/dL        
            

 

 Serum or plasma calcium measurement (mass/volume)            9.4 mg/dL        
    8.5-10.1        



                                                            



Encounters

      





 ACCT No.            Visit Date/Time            Discharge            Status    
        Pt. Type            Provider            Facility            Loc./Unit  
          Complaint        

 

 [id number]            2018 15:44:00            2018 17:40:00    
        DIS            Emergency            ITALO BECKMAN MD            [facility]            ER            LOSS OF VISION, COLD, 
SHAKING - PRE DIABETIC        

 

 [id number]            2014 15:22:00            2014 23:59:59    
        CLS            Outpatient            DEANA ALMEIDA MD            [facility] 
[facility]            RAD            PELVIC PAIN LEFT OVARIAN 
CYST        

 

 [id number]            2014 12:00:00            2014 15:30:00    
        DIS            Outpatient            DEANA ALMEIDA MD            [facility] 
[facility]            SDC            KIDNEY STONE        

 

 [id number]            2014 09:11:00            2014 23:59:59    
        CLS            Outpatient            DEANA ALMEIDA MD            [facility] 
[facility]            RAD            KIDNEY STONE,FLANK PAIN 
       

 

 [id number]            2013 17:06:00            2013 23:59:59    
        CLS            Outpatient                                              
              

 

 [id number]            2013 18:20:00            2013 21:29:00    
        DIS            Emergency            SHELLI PHIPPS            
[facility]            ER            R SIDE ABDOMINAL PAIN
  KIDNEY STONES        

 

 [id number]            2013 12:36:00            2013 13:30:00    
        DIS            Inpatient            DEANA ALMEIDA MD            [facility] 
[facility]            WS            INDUCTION        

 

 [id number]            2013 12:52:00            2013 23:59:59    
        CLS            Outpatient            DEANA ALMEIDA MD            [facility] 
[facility]            RAD            FETAL POSITION,FETAL 
HEARTRATE        

 

 [id number]            2018 18:26:00                                   
   Document Registration                                                       
     

 

 [id number]            2013 10:08:00                                   
   Document Registration                                                       
     

 

 [id number]            2012 11:32:00                                   
   Document Registration                                                       
     

 

 [id number]            2017 13:05:00                                   
   Document Registration                                                       
     

 

 [id number]            2017 14:08:00                                   
   Document Registration                                                       
     

 

 [id number]            2016 18:05:00                                   
   Document Registration                                                       
     

 

 [id number]            2018 15:00:00            2018 23:59:59            
CLS            Outpatient            CAMER[facility] RAUSCH                         
[facility]                     

 

 [id number]            2018 15:40:00                                      
Document Registration                                                          
  

 

 [id number]            2018 12:20:00                                      
Document Registration                                                          
  

 

 [id number]            2018 08:20:00                                      
Document Registration                                                          
  

 

 [id number]            12/15/2017 09:00:00                                      
Document Registration

## 2018-07-19 NOTE — XMS REPORT
Continuity of Care Document

 Created on: 05/15/2013



SAIDA ARREOLA

External Reference #: [id number]

: 1985

Sex: Female



Demographics







 Address  [address] [place]  [address]

 

 Home Phone  [phone]

 

 Preferred Language  Unknown

 

 Marital Status  Unknown

 

 Anabaptism Affiliation  Unknown

 

 Race  Unknown

 

 Ethnic Group  Unknown





Author







 Author  [facility]

 

 Organization  [facility]

 

 Address  Unknown

 

 Phone  Unavailable







Support







 Name  Relationship  Address  Phone

 

 QIANA ZELAYA  Next Of Kin  [address] [place]  [address] [phone]



                                            



Insurance Providers

                      





 Payer Name                    Policy Number                    Subscriber Name
                    Relationship                

 

 Self Pay                                         [employer]              
      01 Self / Same As Patient                



                                                                    



Advance Directives

                      





 Directive                    Response                    Recorded Date        
        

 

 Advance Directives                    N                    13 1:22pm    
            

 

 Health Care Power of                     N                    11 
8:35pm                

 

 Organ Donor                    N                    11 8:35pm           
     



                                                                               
         



Problems

          No Known Problems or Medical conditions.                             
                                       



Family History

                      





 History                    Response                    Recorded Date/Time     
           

 

 Hx Family Cancer                    N                    13 1:36pm      
          



                                                                    



Social History

                      





 History                    Response                    Recorded Date/Time     
           

 

 Alcohol Use                    Denies Use                    13 1:33pm  
              

 

 Recreational Drug Use                    N                    13 1:33pm 
               

 

 Recent Foreign Travel                    N                    13 1:33pm 
               

 

 Recent Infectious Disease Exposure                    N                     1:33pm                

 

 Hospitalization with Isolation                    Denies                     1:57pm                



                                                                               
         



Allergies, Adverse Reactions, Alerts

                      





 Allergen                    Type                    Severity                   
 Reaction                    Last Updated                

 

 No Known Allergies                    Allergy                    Unknown      
                                   06                



                                                                               
                   



Medications

                      





 Medication                    Dose                    Units                    
Route                    Sig                    Qty                    Days    
            

 

 Ferrous Sulfate                    325                    Mg                  
  PO                    DAILY                                                  
        

 

 Prenatal Vits W-Ca,Fe,Fa(<1MG) (Prenatal Vitamins)                    1       
             Each                    PO                    DAILY               
                                           



                                                                               
         



Pregnancy

                      





 Response                    Recorded Date/Time                

 

 Status not known                    Unknown                



                                                                              



Results

                      





 Test                    Date                    Result                    
Interp.                    Ref. Range                

 

 Absolute Reticulocyte Count                    2009 4:09pm       
             41  10^3/uL                    N                    22-82         
       

 

 Activated Partial Thromboplast Time                    2008 12:
05pm                    26  SEC                    N                    24-35  
              

 

 Alanine Aminotransferase (ALT/SGPT)                    2012 11:
53am                    24  U/L                    L                    30-65  
              

 

 Albumin                    2012 11:53am                    3.0  G/
DL                    L                    3.4-5.0                

 

 Alkaline Phosphatase                    2012 11:53am             
       79  U/L                    N                                    

 

 Amylase Level                    2009 12:25pm                    
34  U/L                    N                                    

 

 Anisocytosis                    2009 4:09pm                    
MODERATE                                         -                

 

 Aspartate Amino Transf (AST/SGOT)                    2012 11:53am
                    11  U/L                    L                    15-37      
          

 

 BUN/Creatinine Ratio                    2012 11:53am             
       12                                         -                

 

 Band Neutrophils                    2009 4:09pm                  
  0  %                                         -                

 

 Basophils # (Auto)                    May 10, 2013 3:03pm                    
0.0  10^3/uL                    N                    0.0-0.1                

 

 Basophils % (Manual)                    2009 4:09pm              
      1  %                                         -                

 

 Basophils (%) (Auto)                    May 10, 2013 3:03pm                    
0  %                    N                    0-10                

 

 Blood Urea Nitrogen                    2012 11:53am              
      7  MG/DL                    N                    7-18                

 

 Calcium Level                    2012 11:53am                    
8.5  MG/DL                    N                    8.5-10.1                

 

 Carbon Dioxide Level                    2012 11:53am             
       23  MMOL/L                    N                    21-32                

 

 Chloride Level                    2012 11:53am                    
103  MMOL/L                    N                    101-110                

 

 Cholesterol Level                    2009 12:25pm                
    151  MG/DL                    N                    -200                

 

 Creatinine                    2012 11:53am                    0.6
  MG/DL                    N                    0.6-1.3                

 

 Elliptocytes                    2009 4:09pm                    
MODERATE                                         -                

 

 Eosinophils # (Auto)                    May 10, 2013 3:03pm                    
0.2  10^3/uL                    N                    0.0-0.3                

 

 Eosinophils % (Manual)                    2009 4:09pm            
        2  %                                         -                

 

 Eosinophils (%) (Auto)                    May 10, 2013 3:03pm                 
   2  %                    N                    0-10                

 

 Ferritin                    2009 4:09pm                    22  NG/
ML                                         -                

 

 Glucose Level                    2012 11:53am                    
81  MG/DL                    N                                    

 

 HDL Cholesterol                    2009 12:25pm                  
  40  MG/DL                    N                    35-60                

 

 HIV (1&2) Antibody                    2012 11:53am               
     NR                                         -                

 

 Hematocrit                    May 10, 2013 3:03pm                    28  %    
                L                    35-52                

 

 Hemoglobin                    May 10, 2013 3:03pm                    9.1  G/DL
                    L                    11.5-16.0                

 

 Hemoglobin A1c                    2012 11:53am                    
4.9  %                    N                    4.5-6.2                

 

 Hepatitis B Surface Antigen                    2012 11:53am      
              NR                                         -                

 

 Human Chorionic Gonadotropin, Quant                    2008 12:
05pm                    730  MIU/ML                    H                    0-6
                

 

 Iron Level                    2009 4:09pm                    76  
UG/DL                                         -                

 

 LDL Cholesterol                    2009 12:25pm                  
  98  MG/DL                    N                    0-129                

 

 Lipase                    2009 12:25pm                    163  U/
L                    N                    114-286                

 

 Lymphocytes # (Auto)                    May 10, 2013 3:03pm                    
1.2  X 10^3                    N                    1.0-4.0                

 

 Lymphocytes % (Manual)                    2009 4:09pm            
        18  %                                         -                

 

 Lymphocytes (%) (Auto)                    May 10, 2013 3:03pm                 
   15  %                    N                    12-44                

 

 Mean Corpuscular Hemoglobin                    May 10, 2013 3:03pm            
        28  PG                    N                    25-34                

 

 Mean Corpuscular Hemoglobin Concent                    May 10, 2013 3:03pm    
                33  G/DL                    N                    32-36         
       

 

 Mean Corpuscular Volume                    May 10, 2013 3:03pm                
    83  FL                    N                    80-99                

 

 Mean Platelet Volume                    May 10, 2013 3:03pm                    
11.6  FL                    H                    7.4-10.4                

 

 Microcytosis                    2009 4:09pm                    
MODERATE                                         -                

 

 Monocytes # (Auto)                    May 10, 2013 3:03pm                    
0.5  X 10^3                    N                    0.0-1.0                

 

 Monocytes % (Manual)                    2009 4:09pm              
      1  %                                         -                

 

 Monocytes (%) (Auto)                    May 10, 2013 3:03pm                    
6  %                    N                    0-12                

 

 Neutrophils # (Auto)                    May 10, 2013 3:03pm                    
6.1  X 10^3                    N                    1.8-7.8                

 

 Neutrophils % (Manual)                    2009 4:09pm            
        78  %                                         -                

 

 Neutrophils (%) (Auto)                    May 10, 2013 3:03pm                 
   76  %                    H                    42-75                

 

 Percent Reticulocyte Count                    2009 4:09pm        
            0.91  %                    N                    0.50-2.40          
      

 

 Platelet Count                    May 10, 2013 3:03pm                    165  
10^3/uL                    N                    130-400                

 

 Potassium Level                    2012 11:53am                  
  4.1  MMOL/L                    N                    3.6-5.0                

 

 Prothromb Time International Ratio                    2008 12:
05pm                    1.1                    N                    0.8-1.4    
            

 

 Prothrombin Time                    2008 12:05pm                 
   15.0  SEC                    H                    12.2-14.7                

 

 Rapid Plasma Reagin                    2012 11:53am              
      NR                                         -                

 

 Red Blood Count                    May 10, 2013 3:03pm                    3.30
  10^6/uL                    L                    4.35-5.85                

 

 Red Cell Distribution Width                    May 10, 2013 3:03pm            
        14.1  %                    N                    10.0-14.5              
  

 

 Rubella Antibody                    2012 11:53am                 
   IMMUNE                                         -                

 

 Schistocytes                    2009 4:09pm                    
SLIGHT                                         -                

 

 Serum Pregnancy Test, Qualitative                    2008 12:05pm
                    Positive                                         -         
       

 

 Sodium Level                    2012 11:53am                    
134  MMOL/L                    L                    135-145                

 

 Spherocytes                    2009 4:09pm                    
SLIGHT                                         -                

 

 TSH [place] Testing                    2012 11:53am              
      0.65  UIU/ML                    N                    0.34-5.60           
     

 

 Total Bilirubin                    2012 11:53am                  
  0.3  MG/DL                    N                    0.0-1.0                

 

 Total Iron Binding Capacity                    2009 4:09pm       
             355  UG/DL                                         -              
  

 

 Total Protein                    2012 11:53am                    
7.4  G/DL                    N                    6.4-8.2                

 

 Transferrin % Saturation                    2009 4:09pm          
          21  %                                         -                

 

 Triglycerides Level                    2009 12:25pm              
      67  MG/DL                    N                    30.0-150.0             
   

 

 Urine Bacteria                    May 09, 2013 12:40pm                    
TRACE  /HPF                                         -                

 

 Urine Bilirubin                    May 09, 2013 12:40pm                    
NEGATIVE                                         -                

 

 Urine Casts                    May 09, 2013 12:40pm                    NONE  /
LPF                                         -                

 

 Urine Clarity                    May 09, 2013 12:40pm                    CLEAR
                                         -                

 

 Urine Color                    May 09, 2013 12:40pm                    YELLOW 
                                        -                

 

 Urine Crystals                    May 09, 2013 12:40pm                    NONE
  /LPF                                         -                

 

 Urine Culture Indicated                    May 09, 2013 12:40pm               
     YES                                         -                

 

 Urine Glucose (UA)                    May 09, 2013 12:40pm                    
NEGATIVE                                         -                

 

 Urine Ketones                    May 09, 2013 12:40pm                    
NEGATIVE                                         -                

 

 Urine Leukocyte Esterase                    May 09, 2013 12:40pm              
      TRACE                    H                    -                

 

 Urine Mucus                    May 09, 2013 12:40pm                    
NEGATIVE  /LPF                                         -                

 

 Urine Nitrite                    May 09, 2013 12:40pm                    
NEGATIVE                                         -                

 

 Urine Pregnancy Test                    2009 12:30pm             
       NEGATIVE                                         -                

 

 Urine Protein                    May 09, 2013 12:40pm                    
NEGATIVE                                         -                

 

 Urine RBC                    May 09, 2013 12:40pm                    10-25  /
HPF                    H                    -                

 

 Urine Specific Gravity                    May 09, 2013 12:40pm                
    1.025                    H                    -                

 

 Urine Squamous Epithelial Cells                    May 09, 2013 12:40pm       
             0-2  /HPF                                         -                

 

 Urine Urobilinogen                    May 09, 2013 12:40pm                    
NORMAL  MG/DL                                         -                

 

 Urine WBC                    May 09, 2013 12:40pm                    5-10  /
HPF                    H                    -                

 

 Urine pH                    May 09, 2013 12:40pm                    5         
                                -                

 

 VLDL Cholesterol                    2009 12:25pm                 
   13  MG/DL                    N                    5-40                

 

 White Blood Count                    May 10, 2013 3:03pm                    
8.0  10^3/uL                    N                    4.3-11.0                

 

 Pathology Consult Specimen                    2009 4:09pm        
            [id number]                                         -                

 

 Lab Scanned Report                    2011 2:21pm                    
Transfusion Reaction Form  [id number]                                         -   
             

 

 Smear Scan                    2009 12:25pm                       
                                       -                

 

 Estimat Glomerular Filtration Rate                    2012 11:
53am                    > 60                                         -         
       

 

 Urine RBC (Auto)                    May 09, 2013 12:40pm                    2+
                    H                    -                



                                                                               
                                                                               
                                                                               
                                                                               
                                                                               
                                                                               
                                                                               
                                                                               
                                                                               
                                                                               
                                                                               
                                                                               
                                                            



Procedures

                      





 Procedure                    Code                    Date                

 

 MANUAL ASSIST DELIV NEC                    73.59                    06  
              

 

 TREATMENT OF MISCARRIAGE                    25851                    08 
               

 

 MANUAL ASSIST DELIV NEC                    73.59                    11  
              

 

 OTHER  INSTILLATION                    96.49                    11    
            

 

 MANUAL ASSIST DELIV NEC                    73.59                    13  
              

 

 MEDICAL INDUCTION LABOR                    73.4                    13   
             

 

 Urine Culture                                         13                



                                                                               
                                                           



Encounters

                      





 Encounter                    Location                    Date/Time            
    

 

 Discharged Inpatient                    [facility]                     12:36pm

## 2018-07-19 NOTE — HISTORY AND PHYSICAL
DATE OF SERVICE:  



ATTENDING PRIMARY CARE PHYSICIAN:

Dr. Musa.



HISTORY OF PRESENT ILLNESS:

The patient is a 33-year-old female who presented to [facility] with pain

in the right lower abdominal quadrant for the past two days.  She reports that

the pain is sharp in nature and has increased in severity over time.  She does

not report any fever or chills; however, did report some mild nausea starting

today; however, no vomiting.  She states that she has not had this type of

symptoms before in the past.  She states that no episodes of diarrhea nor

constipation.  She was sent for CT scan, which did show dilatation as well as

mild inflammation of the appendix consistent with an early appendicitis.



PAST MEDICAL HISTORY:

Non-insulin dependent diabetes, history of nephrolithiasis.



PAST SURGICAL HISTORY:

Cystoscopy and renal stone extraction.



ALLERGIES:

No known drug allergies.



MEDICATIONS:

Metformin daily.



SOCIAL HISTORY:

Negative smoke, negative alcohol.



FAMILY HISTORY:

Noncontributory.



REVIEW OF SYSTEMS:

Well-nourished female currently in no acute distress.  She is not experiencing

any shortness of breath or difficulty breathing.  No chest pain, palpitations,

diaphoresis.  Intermittent episodes of nausea, no vomiting.  Pain in the right

lower abdominal quadrant, which is sharp in nature; however, tolerable.  No

fever, chills, no recent inadvertent weight loss.



PHYSICAL EXAMINATION:

VITAL SIGNS:  Stable, afebrile.

CHEST:  Clear.  Good breath sounds bilaterally.

HEART:  Regular, no murmurs.

EXTREMITIES:  No lower extremity edema.  Negative Homans sign.

HEENT:  No scleral icterus.

NECK:  No cervical lymphadenopathy.

ABDOMEN:  Soft, nondistended.  There is pain in the right lower abdominal

quadrant at McBurney's point with voluntary guarding, no rebound.

SKIN:  Warm, dry.



ASSESSMENT AND PLAN:

A 33-year-old female with early acute appendicitis.  We will proceed with a

diagnostic laparoscopy as well as laparoscopic appendectomy.  We will first

admit her and IV hydrate her as well as start antibiotics.





Job ID: [id number]

DocumentID: [id number]

Dictated Date:  07/19/2018 17:42:10

Transcription Date: 07/19/2018 18:35:07

Dictated By: RANDALL NAVA MD

## 2018-07-20 VITALS — SYSTOLIC BLOOD PRESSURE: 103 MMHG | DIASTOLIC BLOOD PRESSURE: 59 MMHG

## 2018-07-20 VITALS — SYSTOLIC BLOOD PRESSURE: 115 MMHG | DIASTOLIC BLOOD PRESSURE: 77 MMHG

## 2018-07-20 VITALS — DIASTOLIC BLOOD PRESSURE: 77 MMHG | SYSTOLIC BLOOD PRESSURE: 115 MMHG

## 2018-07-20 VITALS — SYSTOLIC BLOOD PRESSURE: 109 MMHG | DIASTOLIC BLOOD PRESSURE: 54 MMHG

## 2018-07-20 VITALS — SYSTOLIC BLOOD PRESSURE: 160 MMHG | DIASTOLIC BLOOD PRESSURE: 69 MMHG

## 2018-07-20 VITALS — DIASTOLIC BLOOD PRESSURE: 56 MMHG | SYSTOLIC BLOOD PRESSURE: 113 MMHG

## 2018-07-20 PROCEDURE — 0DTJ4ZZ RESECTION OF APPENDIX, PERCUTANEOUS ENDOSCOPIC APPROACH: ICD-10-PCS | Performed by: SURGERY

## 2018-07-20 RX ADMIN — CIPROFLOXACIN SCH MLS/HR: 2 INJECTION, SOLUTION INTRAVENOUS at 05:13

## 2018-07-20 RX ADMIN — HYDROMORPHONE HYDROCHLORIDE PRN MG: 1 INJECTION, SOLUTION INTRAMUSCULAR; INTRAVENOUS; SUBCUTANEOUS at 11:58

## 2018-07-20 RX ADMIN — METRONIDAZOLE SCH MLS/HR: 5 INJECTION, SOLUTION INTRAVENOUS at 06:17

## 2018-07-20 RX ADMIN — HYDROMORPHONE HYDROCHLORIDE PRN MG: 1 INJECTION, SOLUTION INTRAMUSCULAR; INTRAVENOUS; SUBCUTANEOUS at 12:10

## 2018-07-20 RX ADMIN — FENTANYL CITRATE PRN MCG: 50 INJECTION, SOLUTION INTRAMUSCULAR; INTRAVENOUS at 13:00

## 2018-07-20 RX ADMIN — SODIUM CHLORIDE, SODIUM LACTATE, POTASSIUM CHLORIDE, AND CALCIUM CHLORIDE SCH MLS/HR: 600; 310; 30; 20 INJECTION, SOLUTION INTRAVENOUS at 02:00

## 2018-07-20 RX ADMIN — FENTANYL CITRATE PRN MCG: 50 INJECTION, SOLUTION INTRAMUSCULAR; INTRAVENOUS at 05:25

## 2018-07-20 RX ADMIN — SODIUM CHLORIDE, SODIUM LACTATE, POTASSIUM CHLORIDE, AND CALCIUM CHLORIDE SCH MLS/HR: 600; 310; 30; 20 INJECTION, SOLUTION INTRAVENOUS at 07:32

## 2018-07-20 NOTE — HISTORY & PHYSICIAL ([facility])
HPI


History of Present Illness:


Patient is a 33 year old female who presented to [facility] with RLQ abdominal pain 

since Tuesday. She states that the pain is sharp and has increased in severity 

since presentation. She denies that the pain radiates anywhere. Admits to mild 

nausea but no vomiting. She denies diarrhea or constipation. She states that 

she has not had anything like this happen in past. She denies urinary frequency 

and dysuria. She is aware that her CT results showed inflammation of the 

appendix and a prepared for surgery.


Source:  patient


Exam Limitations:  no limitations


Date seen by provider:  2018


Time Seen by Provider:  08:15


Attending Physician


Citlaly Musa MD


PCP


Center/[facility]


Consult





Date of Admission


2018 at 17:45





Home Medications


Home Medications


Reviewed patient Home Medication Reconciliation performed by pharmacy 

medication reconciliations technician and/or nursing.


Patients Allergies have been reviewed.





Allergies


Coded Allergies:  


     NKANo Known Allergies (Verified  Allergy, Unknown, 06)





PMH-Social-Family Hx


Patient Social History


Alcohol Use:  Denies Use


Recreational Drug Use:  No


Smoking Status:  Never a Smoker


Recent Foreign Travel:  No


Contact w/other who traveled:  No


Recent Hopitalizations:  Yes


Recent Infectious Disease Expo:  No


Physical Abuse Screen:  No


Sexual Abuse:  No





Immunizations Up To Date


Tetanus Booster (TDap):  Less than 5yrs


Date of Influenza Vaccine:  Dec 5, 2013





Family Medical History


Significant Family History:  No Pertinent Family Hx


Family History:  


Diabetes mellitus





Review of Systems ([facility])


Constitutional:  No chills, No dizziness, No fever, No malaise, No weakness


EENTM:  no symptoms reported


Respiratory:  No cough, No short of breath


Cardiovascular:  No chest pain, No palpitations


Gastrointestinal:  abdominal pain (RLQ); No constipation, No diarrhea; nausea (

mild); No vomiting


Genitourinary:  No dysuria, No frequency, No hematuria


Musculoskeletal:  no symptoms reported


Skin:  no symptoms reported


Psychiatric/Neurological:  No Symptoms Reported





Physical Exam-([facility])


Physical Exam


Vital Signs





 VS - Last 72 Hours, by Label








 18





 17:45 19:45 20:14 00:00


 


Temp 97.7  97.8 99.5


 


Pulse 89  88 87


 


Resp 16  16 16


 


B/P (MAP) 108/66 (80)  104/59 (74) 103/59 (74)


 


Pulse Ox 99  97 97


 


O2 Delivery Room Air Room Air Room Air Room Air


 


    





 18   





 04:20   


 


Temp 98.3   


 


Pulse 90   


 


Resp 16   


 


B/P (MAP) 113/56 (75)   


 


Pulse Ox 97   


 


O2 Delivery Room Air   





Capillary Refill : Less Than 3 Seconds


General Appearance:  no apparent distress, mild distress


HEENT:  PERRL/EOMI


Neck:  non-tender, full range of motion, supple, normal inspection


Respiratory:  chest non-tender, lungs clear, normal breath sounds, no 

respiratory distress, no accessory muscle use


Cardiovascular:  regular rate, rhythm, no edema, no gallop, no JVD, no murmur


Gastrointestinal:  normal bowel sounds, soft, no organomegaly, no pulsatile mass

, tenderness (RLQ)


Rectal:  deferred


Back:  normal inspection


Extremities:  non-tender, normal inspection, no pedal edema, no calf tenderness


Neurologic/Psychiatric:  alert, normal mood/affect, oriented x 3


Skin:  normal color, warm/dry


Lymphatic:  no adenopathy





Assessment/Plan


Assessment/Plan


Admission Status:  Inpatient Order (span 2 midnights)


Reason for Inpatient Admission:  


Acute Appendicitis


Assessment & Plan


Acute Appendicitis 


- CT on  showed evidence of acute appendicitis


- WBC of 12.2


- Laparoscopic Appendectomy with Dr. Nieto scheduled for today.


- IV Metronidazole and Cipro





Clinical Quality Measures


DVT/VTE Risk/Contraindication:


Risk Factor Score Per Nursin


RFS Level Per Nursing on Admit:  1=Low/No VTE PPX











RIAN MORRISON MED STUDENT 2018 09:07

## 2018-07-20 NOTE — CONSULTATION ([facility])
HPI


History of Present Illness:


Patient is a 33 year old female who presented to [facility] with RLQ abdominal pain 

since Tuesday. She states that the pain is sharp and has increased in severity 

since presentation. She denies that the pain radiates anywhere. Admits to mild 

nausea but no vomiting. She denies diarrhea or constipation. She states that 

she has not had anything like this happen in past. She denies urinary frequency 

and dysuria. She is aware that her CT results showed inflammation of the 

appendix and a prepared for surgery.


Source:  patient


Exam Limitations:  no limitations


Date seen by provider:  2018


Time Seen by Provider:  08:15


Attending Physician


Nicolás Musa MD


PCP


Center/[facility] [place],[facility]


Consult





Date of Admission


2018 at 17:45





Home Medications


Home Medications


Reviewed patient Home Medication Reconciliation performed by pharmacy 

medication reconciliations technician and/or nursing.


Patients Allergies have been reviewed.





Allergies


Coded Allergies:  


     NKANo Known Allergies (Verified  Allergy, Unknown, 06)





PMH-Social-Family Hx


Patient Social History


Alcohol Use:  Denies Use


Recreational Drug Use:  No


Smoking Status:  Never a Smoker


Recent Foreign Travel:  No


Contact w/other who traveled:  No


Recent Hopitalizations:  Yes


Recent Infectious Disease Expo:  No


Physical Abuse Screen:  No


Sexual Abuse:  No





Immunizations Up To Date


Tetanus Booster (TDap):  Less than 5yrs


Date of Influenza Vaccine:  Dec 5, 2013





Past Medical History





None





Family Medical History


Significant Family History:  No Pertinent Family Hx


Family History:  


Diabetes mellitus





Review of Systems ([facility])


Constitutional:  No chills, No fever; malaise


EENTM:  no symptoms reported


Respiratory:  No cough, No short of breath


Cardiovascular:  No chest pain, No palpitations


Gastrointestinal:  abdominal pain (RLQ); No constipation, No diarrhea; nausea (

Mild); No vomiting


Genitourinary:  No dysuria, No frequency


Pregnant:  No


Musculoskeletal:  no symptoms reported


Skin:  no symptoms reported


Psychiatric/Neurological:  Denies Headache





Reviewed Test Results


Reviewed Test Results


Lab





Laboratory Tests








Test


 18


18:15 18


07:59 18


10:23 Range/Units


 


 


White Blood Count


 12.2 H


 


 


 4.3-11.0


10^3/uL


 


Red Blood Count


 4.15 L


 


 


 4.35-5.85


10^6/uL


 


Hemoglobin 11.1 L   11.5-16.0  G/DL


 


Hematocrit 34 L   35-52  %


 


Mean Corpuscular Volume 81    80-99  FL


 


Mean Corpuscular Hemoglobin 27    25-34  PG


 


Mean Corpuscular Hemoglobin


Concent 33 


 


 


 32-36  G/DL





 


Red Cell Distribution Width 14.3    10.0-14.5  %


 


Platelet Count


 297 


 


 


 130-400


10^3/uL


 


Mean Platelet Volume 10.8 H   7.4-10.4  FL


 


Neutrophils (%) (Auto) 76 H   42-75  %


 


Lymphocytes (%) (Auto) 16    12-44  %


 


Monocytes (%) (Auto) 6    0-12  %


 


Eosinophils (%) (Auto) 2    0-10  %


 


Basophils (%) (Auto) 0    0-10  %


 


Neutrophils # (Auto) 9.2 H   1.8-7.8  X 10^3


 


Lymphocytes # (Auto) 2.0    1.0-4.0  X 10^3


 


Monocytes # (Auto) 0.7    0.0-1.0  X 10^3


 


Eosinophils # (Auto)


 0.2 


 


 


 0.0-0.3


10^3/uL


 


Basophils # (Auto)


 0.0 


 


 


 0.0-0.1


10^3/uL


 


Sodium Level 139    135-145  MMOL/L


 


Potassium Level 4.0    3.6-5.0  MMOL/L


 


Chloride Level 109 H     MMOL/L


 


Carbon Dioxide Level 21    21-32  MMOL/L


 


Anion Gap 9    5-14  MMOL/L


 


Blood Urea Nitrogen 10    7-18  MG/DL


 


Creatinine


 0.65 


 


 


 0.60-1.30


MG/DL


 


Estimat Glomerular Filtration


Rate > 60 


 


 


  





 


BUN/Creatinine Ratio 15     


 


Glucose Level 106 H     MG/DL


 


Calcium Level 9.4    8.5-10.1  MG/DL


 


Urine Pregnancy Test   NEGATIVE  NEGATIVE  











Physical Exam-([facility])


Physical Exam


Vital Signs





 VS - Last 72 Hours, by Label








 18





 17:45 19:45 20:14 00:00


 


Temp 97.7  97.8 99.5


 


Pulse 89  88 87


 


Resp 16  16 16


 


B/P (MAP) 108/66 (80)  104/59 (74) 103/59 (74)


 


Pulse Ox 99  97 97


 


O2 Delivery Room Air Room Air Room Air Room Air


 


    





 18





 04:20 08:00 12:40 13:53


 


Temp 98.3 98.1 97.3 


 


Pulse 90 71 74 


 


Resp 16 22 24 


 


B/P (MAP) 113/56 (75) 109/54 (72) 160/69 (99) 


 


Pulse Ox 97 93 95 


 


O2 Delivery Room Air Room Air Room Air Room Air


 


    





 18   





 15:30   


 


Temp 98.4   


 


Pulse 103   


 


Resp 18   


 


B/P (MAP) 115/77 (90)   


 


Pulse Ox 95   


 


O2 Delivery Room Air   





Capillary Refill : Less Than 3 Seconds


General Appearance:  no apparent distress


HEENT:  PERRL/EOMI


Neck:  full range of motion, supple


Respiratory:  lungs clear, normal breath sounds, no respiratory distress, no 

accessory muscle use


Cardiovascular:  regular rate, rhythm, no edema, no gallop, no JVD, no murmur


Gastrointestinal:  normal bowel sounds, soft, no organomegaly, no pulsatile mass

, tenderness (RLQ)


Back:  normal inspection


Extremities:  non-tender, normal inspection, no pedal edema, no calf tenderness


Neurologic/Psychiatric:  alert, normal mood/affect, oriented x 3


Skin:  normal color, warm/dry


Lymphatic:  no adenopathy





Assessment/Plan


Assessment/Plan


Admission Status:  Inpatient Order (span 2 midnights)


Reason for Inpatient Admission:  


Acute Appendicitis


Assessment & Plan


Acute Appendicitis 


- CT on  showed evidence of acute appendicitis


- WBC of 12.2


- Laparoscopic Appendectomy with Dr. Nieto scheduled for today.





(1) Appendicitis, acute


Status:  Acute


Assessment & Plan:  - Surgery today with Gerson, d/c after surgery


Qualifiers:  


   Qualified Codes:  K35.80 - Unspecified acute appendicitis





Clinical Quality Measures


DVT/VTE Risk/Contraindication:


Risk Factor Score Per Nursin


RFS Level Per Nursing on Admit:  1=Low/No VTE PPX





Copy


Copies To 1:   Mara HOBBS ANGELA M MED STUDENT 2018 09:24


NICOLÁS MUSA MD 2018 16:03

## 2018-07-20 NOTE — PROGRESS NOTE-POST OPERATIVE
Post-Operative Progess Note


Surgeon (s)/Assistant (s)


Surgeon


RANDALL NAVA MD


Assistant:  none





Pre-Operative Diagnosis


acute appendicitis





Post-Operative Diagnosis





same





Procedure & Operative Findings


Date of Procedure


7/20/18


Procedure Performed/Findings


laparoscopic appendectomy


Anesthesia Type


GET





Estimated Blood Loss


Estimated blood loss (mL):  minimal





Specimens/Packing


Specimens Removed


appendix











RANDALL NAVA MD Jul 20, 2018 11:32 am

## 2018-07-20 NOTE — ANESTHESIA-GENERAL POST-OP
General


Patient Condition


Mental Status/LOC:  Same as Preop


Cardiovascular:  Satisfactory


Nausea/Vomiting:  Absent


Respiratory:  Satisfactory


Pain:  Controlled


Complications:  Absent





Post Op Complications


Complications


None





Follow Up Care/Instructions


Patient Instructions


None needed.





Anesthesia/Patient Condition


Patient Condition


Patient is doing well, no complaints, stable vital signs, no apparent adverse 

anesthesia problems.   


No complications reported per nursing.











MAYELA MO CRNA Jul 20, 2018 15:16

## 2018-07-20 NOTE — OPERATIVE REPORT
DATE OF SERVICE:  07/20/2018



ATTENDING PRIMARY CARE PHYSICIAN:

Dr. Musa.



PREOPERATIVE DIAGNOSIS:

Acute appendicitis.



POSTOPERATIVE DIAGNOSIS:

Acute appendicitis.



PROCEDURE:

Laparoscopic appendectomy.



SURGEON:

Randall Nava MD



ANESTHESIA:

General endotracheal.



ESTIMATED BLOOD LOSS:

Minimal.



FINDINGS:

Inflamed appendix, no perforation.  Uterus and ovaries appeared normal as well 
as small

bowel.



DISPOSITION:

The patient tolerated the procedure well.



INDICATIONS:

The patient is a 33-year-old female who presented to [facility] with pain

in the right lower abdominal quadrant for the past two days.  She reports this

was sharp in nature and increased in severity over time.  She did not report any

fever or chills; however, did report nausea, but no vomiting.  She does not

report ever having this type of symptoms before in the past.  She does not

report any episodes of diarrhea nor constipation.  A CT scan was performed,

which did show dilatation as well as inflammation of the appendix as well as an

appendicolith consistent with an appendicitis.



DESCRIPTION OF PROCEDURE:

The patient was brought to the operating room, laid supine on the table.  After

adequate IV pain and sedating medications and general endotracheal intubation,

the abdomen was prepped and draped in standard surgical fashion.



A 0.5% Marcaine with epinephrine was used to anesthetize the overlying skin in

the left upper abdominal quadrant.  A small transverse skin incision made using

a 15 blade.  An 0 silk suture was applied to the medial aspect of the incision

for retraction and a Veress needle inserted with a low opening pressure of 0

mmHg and the abdomen was then insufflated to 15 mmHg pressure.  The Veress

needle removed and a 5 mm Xcel trocar placed followed by a 5 mm 45 degree angle

laparoscope visualizing the peritoneal cavity.



A 4-quadrant abdominal exploration was performed.  There was an inflamed

appendix, which was walled off by the colon and peritoneal lining.  There was no

perforation identified.  Uterus and ovaries appeared normal as did the small

bowel.  Under direct visualization, we then proceeded to place a supraumbilical

10 mm port after the skin and peritoneal lining were anesthetized using 0.5%

Marcaine with epinephrine and a transverse skin incision made using a 15 blade. 

In a similar manner, a suprapubic 5 mm port was placed.



The patient was then placed in Trendelenburg position as well as plane right

side up, left side down.  The appendix was then gently dissected out and intact

with no perforation identified.  The appendix was then retracted towards the

anterior abdominal wall and a window was then created between the mesoappendix

and the base of the appendix at the cecum using a [place] dissector.  The

appendix was then stapled and transected at its cecal base using a KAREN 45 mm

stapler with a 2.5 mm thickness load.  The mesoappendix was then stapled and

transected with the same stapler with a 2 mm thickness reload.  Good hemostasis

was observed.  The appendix was removed through the 10 mm port site using

EndoCatch bag.



The 10 mm port site fascia and peritoneum were then closed under direct

visualization using David-Grace device and an 0 Vicryl suture.  The abdomen

was desufflated and remaining ports removed.  All skin incisions were closed

using 4-0 Monocryl running subcuticular sutures.  Wounds were then cleaned and

covered with Dermabond.



The patient tolerated the procedure well.  We will admit her back to the floor,

start a regular diet and when she is tolerating liquids, has good pain control

with oral pain medications and ambulating well with this, we will discharge her

home.





Job ID: [id number]

DocumentID: [id number]

Dictated Date:  07/20/2018 11:41:50

Transcription Date: 07/20/2018 16:12:28

Dictated By: RANDALL NAVA MD

[facility] Psych[facility] HospitalRYAN

## 2018-07-20 NOTE — DISCHARGE INST-SURGICAL
D/C Lap Instructions-BRANDY


New, Converted, or Re-Newed RX:  RX on Chart


Follow Up Appt in 2 weeks





Activity as tolerated


No driving for 24 hours


No driving while on pain medications





Incentive Spirometry use every 2 hours while awake





Regular Diet





Symptoms to Report: Fever over 101 degree F, Nausea/Vomiting 


Infection Signs and Symptoms to report:  Increased redness, Foul odor of wound, 

Increased drainage





Bathing instructions: May shower


Operative Area Clean/Dry;  Keep incision clean/dry





If any problems/questions: Contact your physician or go to Emergency Room











RANDALL NAVA MD Jul 20, 2018 11:36 am

## 2018-07-26 NOTE — PHYSICIAN QUERY-FINAL DX
CIELO GRANDE 7/26/18 1217:


Final Diagnosis


Give Final Diagnosis


Please give Final Diagnosis





RANDALL NAVA MD 7/26/18 1933:


Final Diagnosis


Give Final Diagnosis


acute appendicitis











CIELO GRANDE Jul 26, 2018 12:17


RANDALL NAVA MD Jul 26, 2018 19:33

## 2020-07-09 ENCOUNTER — HOSPITAL ENCOUNTER (OUTPATIENT)
Dept: HOSPITAL 75 - ER | Age: 35
Setting detail: OBSERVATION
LOS: 5 days | Discharge: HOME | End: 2020-07-14
Attending: INTERNAL MEDICINE | Admitting: FAMILY MEDICINE
Payer: SELF-PAY

## 2020-07-09 VITALS — DIASTOLIC BLOOD PRESSURE: 69 MMHG | SYSTOLIC BLOOD PRESSURE: 116 MMHG

## 2020-07-09 VITALS — SYSTOLIC BLOOD PRESSURE: 116 MMHG | DIASTOLIC BLOOD PRESSURE: 68 MMHG

## 2020-07-09 VITALS — SYSTOLIC BLOOD PRESSURE: 134 MMHG | DIASTOLIC BLOOD PRESSURE: 84 MMHG

## 2020-07-09 VITALS — SYSTOLIC BLOOD PRESSURE: 116 MMHG | DIASTOLIC BLOOD PRESSURE: 69 MMHG

## 2020-07-09 VITALS — BODY MASS INDEX: 34.53 KG/M2 | HEIGHT: 60.63 IN | WEIGHT: 180.56 LBS

## 2020-07-09 VITALS — SYSTOLIC BLOOD PRESSURE: 129 MMHG | DIASTOLIC BLOOD PRESSURE: 78 MMHG

## 2020-07-09 DIAGNOSIS — J18.1: ICD-10-CM

## 2020-07-09 DIAGNOSIS — Z87.442: ICD-10-CM

## 2020-07-09 DIAGNOSIS — E66.9: ICD-10-CM

## 2020-07-09 DIAGNOSIS — J96.01: ICD-10-CM

## 2020-07-09 DIAGNOSIS — Z79.4: ICD-10-CM

## 2020-07-09 DIAGNOSIS — D64.9: ICD-10-CM

## 2020-07-09 DIAGNOSIS — U07.1: Primary | ICD-10-CM

## 2020-07-09 DIAGNOSIS — E87.6: ICD-10-CM

## 2020-07-09 DIAGNOSIS — E11.9: ICD-10-CM

## 2020-07-09 LAB
ALBUMIN SERPL-MCNC: 4.1 GM/DL (ref 3.2–4.5)
ALP SERPL-CCNC: 93 U/L (ref 40–136)
ALT SERPL-CCNC: 26 U/L (ref 0–55)
APTT PPP: YELLOW S
BACTERIA #/AREA URNS HPF: NEGATIVE /HPF
BASOPHILS # BLD AUTO: 0 10^3/UL (ref 0–0.1)
BASOPHILS NFR BLD AUTO: 0 % (ref 0–10)
BILIRUB SERPL-MCNC: 0.4 MG/DL (ref 0.1–1)
BILIRUB UR QL STRIP: NEGATIVE
BUN/CREAT SERPL: 5
CALCIUM SERPL-MCNC: 8.7 MG/DL (ref 8.5–10.1)
CHLORIDE SERPL-SCNC: 106 MMOL/L (ref 98–107)
CO2 SERPL-SCNC: 18 MMOL/L (ref 21–32)
CREAT SERPL-MCNC: 0.77 MG/DL (ref 0.6–1.3)
EOSINOPHIL # BLD AUTO: 0 10^3/UL (ref 0–0.3)
EOSINOPHIL NFR BLD AUTO: 0 % (ref 0–10)
ERYTHROCYTE [DISTWIDTH] IN BLOOD BY AUTOMATED COUNT: 14.6 % (ref 10–14.5)
ERYTHROCYTE [SEDIMENTATION RATE] IN BLOOD: 88 MM/HR (ref 0–20)
FIBRINOGEN PPP-MCNC: CLEAR MG/DL
GFR SERPLBLD BASED ON 1.73 SQ M-ARVRAT: > 60 ML/MIN
GLUCOSE SERPL-MCNC: 207 MG/DL (ref 70–105)
GLUCOSE UR STRIP-MCNC: NEGATIVE MG/DL
HCT VFR BLD CALC: 35 % (ref 35–52)
HGB BLD-MCNC: 11.8 G/DL (ref 11.5–16)
KETONES UR QL STRIP: NEGATIVE
LEUKOCYTE ESTERASE UR QL STRIP: NEGATIVE
LYMPHOCYTES # BLD AUTO: 0.9 X 10^3 (ref 1–4)
LYMPHOCYTES NFR BLD AUTO: 13 % (ref 12–44)
MANUAL DIFFERENTIAL PERFORMED BLD QL: NO
MCH RBC QN AUTO: 27 PG (ref 25–34)
MCHC RBC AUTO-ENTMCNC: 34 G/DL (ref 32–36)
MCV RBC AUTO: 80 FL (ref 80–99)
MONOCYTES # BLD AUTO: 0.2 X 10^3 (ref 0–1)
MONOCYTES NFR BLD AUTO: 3 % (ref 0–12)
NEUTROPHILS # BLD AUTO: 5.6 X 10^3 (ref 1.8–7.8)
NEUTROPHILS NFR BLD AUTO: 84 % (ref 42–75)
NITRITE UR QL STRIP: NEGATIVE
PH UR STRIP: 6 [PH] (ref 5–9)
PLATELET # BLD: 222 10^3/UL (ref 130–400)
PMV BLD AUTO: 10.4 FL (ref 7.4–10.4)
POTASSIUM SERPL-SCNC: 2.7 MMOL/L (ref 3.6–5)
PROT SERPL-MCNC: 7.8 GM/DL (ref 6.4–8.2)
PROT UR QL STRIP: (no result)
RBC #/AREA URNS HPF: (no result) /HPF
SODIUM SERPL-SCNC: 138 MMOL/L (ref 135–145)
SP GR UR STRIP: 1.02 (ref 1.02–1.02)
SQUAMOUS #/AREA URNS HPF: (no result) /HPF
WBC # BLD AUTO: 6.6 10^3/UL (ref 4.3–11)
WBC #/AREA URNS HPF: (no result) /HPF

## 2020-07-09 PROCEDURE — 94760 N-INVAS EAR/PLS OXIMETRY 1: CPT

## 2020-07-09 PROCEDURE — 85025 COMPLETE CBC W/AUTO DIFF WBC: CPT

## 2020-07-09 PROCEDURE — 71045 X-RAY EXAM CHEST 1 VIEW: CPT

## 2020-07-09 PROCEDURE — 96375 TX/PRO/DX INJ NEW DRUG ADDON: CPT

## 2020-07-09 PROCEDURE — 83605 ASSAY OF LACTIC ACID: CPT

## 2020-07-09 PROCEDURE — 84703 CHORIONIC GONADOTROPIN ASSAY: CPT

## 2020-07-09 PROCEDURE — 82962 GLUCOSE BLOOD TEST: CPT

## 2020-07-09 PROCEDURE — 83735 ASSAY OF MAGNESIUM: CPT

## 2020-07-09 PROCEDURE — 94664 DEMO&/EVAL PT USE INHALER: CPT

## 2020-07-09 PROCEDURE — 96365 THER/PROPH/DIAG IV INF INIT: CPT

## 2020-07-09 PROCEDURE — 86141 C-REACTIVE PROTEIN HS: CPT

## 2020-07-09 PROCEDURE — 96361 HYDRATE IV INFUSION ADD-ON: CPT

## 2020-07-09 PROCEDURE — 94761 N-INVAS EAR/PLS OXIMETRY MLT: CPT

## 2020-07-09 PROCEDURE — 83615 LACTATE (LD) (LDH) ENZYME: CPT

## 2020-07-09 PROCEDURE — 85379 FIBRIN DEGRADATION QUANT: CPT

## 2020-07-09 PROCEDURE — 87040 BLOOD CULTURE FOR BACTERIA: CPT

## 2020-07-09 PROCEDURE — 87635 SARS-COV-2 COVID-19 AMP PRB: CPT

## 2020-07-09 PROCEDURE — 99284 EMERGENCY DEPT VISIT MOD MDM: CPT

## 2020-07-09 PROCEDURE — 80053 COMPREHEN METABOLIC PANEL: CPT

## 2020-07-09 PROCEDURE — 36415 COLL VENOUS BLD VENIPUNCTURE: CPT

## 2020-07-09 PROCEDURE — 85652 RBC SED RATE AUTOMATED: CPT

## 2020-07-09 PROCEDURE — 81000 URINALYSIS NONAUTO W/SCOPE: CPT

## 2020-07-09 PROCEDURE — 94640 AIRWAY INHALATION TREATMENT: CPT

## 2020-07-09 PROCEDURE — 84145 PROCALCITONIN (PCT): CPT

## 2020-07-09 RX ADMIN — ACETAMINOPHEN PRN MG: 500 TABLET ORAL at 14:39

## 2020-07-09 RX ADMIN — INSULIN ASPART SCH UNIT: 100 INJECTION, SOLUTION INTRAVENOUS; SUBCUTANEOUS at 14:33

## 2020-07-09 RX ADMIN — ACETAMINOPHEN PRN MG: 500 TABLET ORAL at 19:44

## 2020-07-09 RX ADMIN — Medication SCH ML: at 14:27

## 2020-07-09 RX ADMIN — ALBUTEROL SULFATE SCH GM: 90 AEROSOL, METERED RESPIRATORY (INHALATION) at 21:57

## 2020-07-09 RX ADMIN — Medication SCH ML: at 19:45

## 2020-07-09 RX ADMIN — MORPHINE SULFATE PRN MG: 4 INJECTION, SOLUTION INTRAMUSCULAR; INTRAVENOUS at 18:44

## 2020-07-09 RX ADMIN — INSULIN ASPART SCH UNIT: 100 INJECTION, SOLUTION INTRAVENOUS; SUBCUTANEOUS at 19:45

## 2020-07-09 NOTE — XMS REPORT
[facility]

                             Created on: 2017



Saida Fermin

External Reference #: [id number]

: 1985

Sex: Female



Demographics





                          Address                   [address] [place]  [address]

 

                          Preferred Language        Unknown

 

                          Marital Status            Unknown

 

                          Pentecostalism Affiliation     Unknown

 

                          Race                      Unknown

 

                          Ethnic Group              Unknown





Author





                          Author                    Saida KAY

 

                          [facility]

 

                          Address                   [address] [place]  [address]



 

                          Phone                     [phone]







Care Team Providers





                    Care Team Member Name Role                Phone

 

                    JEROME KAY   Unavailable         [phone]







PROBLEMS





          Type      Condition ICD9-CM Code OVU98-FZ Code Onset Dates Condition S

tatus SNOMED 

Code

 

          Problem   Dental examination           Z01.20              Active    1

[id number]

 

          Assessment Dental examination           Z01.20    24 Aug, 2016 Active 

   [id number]







ALLERGIES

Unknown Allergies



SOCIAL HISTORY

No smoking Hx information available



PLAN OF CARE





VITAL SIGNS





MEDICATIONS

Unknown Medications



RESULTS

No Results



PROCEDURES





                Procedure       Date Ordered    Related Diagnosis Body Site

 

                Dental Prepay for Future Services Aug 24, 2016                  

   







IMMUNIZATIONS

No Known Immunizations

## 2020-07-09 NOTE — XMS REPORT
[facility]

                             Created on: 2018



Saida Fermin

External Reference #: [id number]

: 1985

Sex: Female



Demographics





                          Address                   [address] [place]  [address]

 

                          Preferred Language        Unknown

 

                          Marital Status            Unknown

 

                          Gnosticism Affiliation     Unknown

 

                          Race                      Unknown

 

                          Ethnic Group              Unknown





Author





                          Author                    Saida SOLITARIO

 

                          Organization              [facility]

 

                          Address                   [address] [place]  [address]



 

                          Phone                     [phone]







Care Team Providers





                    Care Team Member Name Role                Phone

 

                    ZOIE SOLITARIO    Unavailable         [phone]







PROBLEMS





          Type      Condition ICD9-CM Code EFM60-BR Code Onset Dates Condition S

tatus SNOMED 

Code

 

          Problem   Obesity (BMI 30.0-34.9)           E66.9               Active

    [id number]

 

          Problem   Abnormal CBC measurement           R79.89              Activ

e    [id number]

 

          Problem   Other obesity due to excess calories           E66.09       

       Active    351737613

 

          Problem   Primary insomnia           F51.01              Active    397

2004

 

                Problem         Iron deficiency anemia secondary to inadequate d

ietary iron intake                 

D50.8                                   Active              [id number]

 

                Problem         Migraine without aura and without status migrain

osus, not intractable                 

G43.009                                 Active              919148914

 

          Problem   Chronic fatigue           R53.82              Active    8422

9001

 

          Problem   Body mass index (BMI) of 34.0-34.9 in adult           Z68.34

              Active    [id number]







ALLERGIES

No Information



ENCOUNTERS





                Encounter       Location        Date            Diagnosis

 

                          [place] [address] [place] [address] [id number]

[address] [place] [address]

                          11 Sep, 2018               

 

                          [place] [address] [place] [address] [id number]

[address] [place] [address]

                          06 Sep, 2018              Encounter for Depo-Provera c

ontraception Z30.42

 

                          [facility]     [address] [facility] [id number]

[address] [place] [address]

                                         

 

                          [place] [address] [place] [id number]

[address] [place] [address]

                                        Right lower quadrant abdomin

al pain R10.31

 

                          [place] [address] [facility] [id number]

[address] [place] [address]

                          31 May, 2018              Iron deficiency anemia secon

adriano to inadequate dietary iron intake 

D50.8 ; Migraine without aura and without status migrainosus, not intractable 
G43.009 ; Other obesity due to excess calories E66.09 ; Body mass index (BMI) of
34.0-34.9 in adult Z68.34 ; Elevated fasting glucose R73.01 ; Primary insomnia 
F51.01 and Chronic fatigue R53.82

 

                          [place] [address] [address]

                          22 May, 2018              Encounter for Depo-Provera c

ontraception Z30.42

 

                          [place] [address] [address]

                          20 2018              Encounter for well woman exa

m Z01.419 ; Encounter for Depo-Provera 

contraception Z30.42 ; Obesity (BMI 30.0-34.9) E66.9 and Hyperinsulinemia E16.1

 

                          [place] [address] [address]

                          09 2018              Hyperinsulinemia E16.1

 

                          [place] [address] [address]

                          08 2018              Hyperinsulinemia E16.1

 

                          [place] [address] [address]

                          05 2018              Hyperinsulinemia E16.1 ; Zackery

hayes without aura and without status 

migrainosus, not intractable G43.009 ; Obesity (BMI 30.0-34.9) E66.9 and Iron 
deficiency anemia secondary to inadequate dietary iron intake D50.8

 

                          [place] [address] [address]

                          15 Dec, 2017              Hyperinsulinemia E16.1 ; Zackery

hayes without aura and without status 

migrainosus, not intractable G43.009 ; Obesity (BMI 30.0-34.9) E66.9 ; Iron 
deficiency anemia secondary to inadequate dietary iron intake D50.8 ; Oral 
contraceptive pill surveillance Z30.41 and Dysuria R30.0

 

                          [place] [address] [address]

                          15 Dec, 2017              Abnormal CBC R79.89

 

                          [facility]  [address] [id number]

[address] [place] [address] 

[address]                17 2017              Tachycardia R00.0

 

                          [facility]     [address] [address]

                          17 2017               

 

                          [facility]     [address] [address]

                          14 Sep, 2017              Hyperinsulinemia E16.1 ; Obe

sity (BMI 30.0-34.9) E66.9 and Migraine

without aura and without status migrainosus, not intractable G43.009

 

                          [facility]     [address] [address]

                          15 Aug, 2017               

 

                          [facility]  [address] 

[address]                10 Aug, 2017              Acute non-recurrent maxillar

y sinusitis J01.00

 

                          [facility]   [address] [place] [id number]

                                        Dental examination Z01.20

 

                          [facility]   [address] [id number]

                          31 May, 2017              Dental examination Z01.20

 

                          [place] [address] [address]

                                        Encounter for well woman exa

m with routine gynecological exam 

Z01.419 ; Encounter for screening for malignant neoplasm of cervix Z12.4 ; 
Encounter for screening breast examination Z12.39 ; Screen for STD (sexually 
transmitted disease) Z11.3 and Encounter for prescription of oral contraceptives
Z30.011

 

                          [facility]   [address] [facility] [id number]

[address] [place] [phone]

                                        Dental examination Z01.20

 

                          [facility]     [address] [address]

                          19 Dec, 2016              Abnormal CBC R79.89

 

                          [facility]     [address] [place] [address] [place] [address]

                          12 Dec, 2016              Routine health maintenance Z

00.00 ; History of renal calculi 

Z87.442 ; Obesity (BMI 30.0-34.9) E66.9 and Family history of diabetes mellitus 
Z83.3

 

                          [facility]     [address] [facility] [id number]

[address] [place] [address]

                          08 Dec, 2016              Routine health maintenance Z

00.00 ; History of renal calculi 

Z87.442 ; Obesity (BMI 30.0-34.9) E66.9 and Family history of diabetes mellitus 
Z83.3

 

                          [facility]  [address] [facility] [id number]

[address] [place] [address] 

[address]                28 Sep, 2016              Irritable bowel syndrome wit

h diarrhea K58.0

 

                          [facility]   [address] [place] [phone]

                          24 Aug, 2016              Dental examination Z01.20

 

                          [facility]   [address] [place] [id number]

                                        Dental caries K02.9

 

                          [facility]   [address] [place] [phone]

                          24 May, 2016              Dental examination Z01.20







IMMUNIZATIONS

No Known Immunizations



SOCIAL HISTORY

Never Assessed



REASON FOR VISIT

Positive CT



PLAN OF CARE





VITAL SIGNS





MEDICATIONS

Unknown Medications



RESULTS

No Results



PROCEDURES

No Known procedures



INSTRUCTIONS





MEDICATIONS ADMINISTERED

No Known Medications



MEDICAL (GENERAL) HISTORY





                    Type                Description         Date

 

                    Medical History     ERCP                 

 

                    Medical History     Renal calculi        

 

                    Medical History     Migraines            

 

                    Surgical History    kidney stone removed  

 

                    Hospitalization History kidney stones        

 

                          Hospitalization History   ER visit- itching all over b

madelin, blurry vision, SOB dxd 

migraine                                2018

## 2020-07-09 NOTE — XMS REPORT
[facility]

                             Created on: 2017



Saida Fermin

External Reference #: [id number]

: 1985

Sex: Female



Demographics





                          Address                   [address] [place]  [address]

 

                          Preferred Language        Unknown

 

                          Marital Status            Unknown

 

                          Yazidism Affiliation     Unknown

 

                          Race                      Unknown

 

                          Ethnic Group              Unknown





Author





                          Author                    Saida VAUGHN

 

                          [facility]

 

                          Address                   [address] [place]  [address]



 

                          Phone                     [phone]







Care Team Providers





                    Care Team Member Name Role                Phone

 

                    SHASHANK VAUGHN      Unavailable         [phone]







PROBLEMS





          Type      Condition ICD9-CM Code NUX71-KT Code Onset Dates Condition S

tatus SNOMED 

Code

 

          Problem   Dental examination           Z01.20              Active    1

[id number]

 

          Assessment Irritable bowel syndrome with diarrhea           K58.0     

28 Sep, 2016 Active    

[id number]







ALLERGIES





             Substance    Reaction     Event Type   Date         Status

 

             N.K.D.A.     Unknown      Non Drug Allergy 28 Sep, 2016 Unknown







SOCIAL HISTORY

No smoking Hx information available



PLAN OF CARE





VITAL SIGNS





                    Height              61 in               2016

 

                    Weight              173.2 lbs           2016

 

                    Heart Rate          60 bpm              2016

 

                    Respiratory Rate    20                  2016

 

                    BMI                 32.72 kg/m2         2016

 

                    Blood pressure systolic 108 mmHg            2016

 

                    Blood pressure diastolic 64 mmHg             2016







MEDICATIONS





        Medication Instructions Dosage  Frequency Start Date End Date Duration S

tatus

 

           Dicyclomine HCl 20 mg Orally 2 times a day 1 tablet   12h        28 S

ep,                       30 day(s)                 Active

 

        immodium         1 tab                                   Active

 

        Estradiol 2 MG Orally Once a day 1 tablet 24h                           

  Active







RESULTS

No Results



PROCEDURES





                Procedure       Date Ordered    Related Diagnosis Body Site

 

                Office Visit, Est Pt., Level 3 2016                    







IMMUNIZATIONS

No Known Immunizations

## 2020-07-09 NOTE — DIAGNOSTIC IMAGING REPORT
INDICATION: Respiratory distress.



Portable chest 12:27 AM



FINDINGS: There are some scattered patchy alveolar infiltrates in

the lungs. Heart size and pulmonary vascularity are normal. There

are no effusions or pneumothoraces.



IMPRESSION: Patchy alveolar infiltrates suspicious for pneumonia.



Dictated by: 



  Dictated on workstation # [id number]

## 2020-07-09 NOTE — XMS REPORT
[facility]

                             Created on: 2018



Saida Fermin

External Reference #: [id number]

: 1985

Sex: Female



Demographics





                          Address                   [address] [place]  [address]

 

                          Preferred Language        Unknown

 

                          Marital Status            Unknown

 

                          Yazidi Affiliation     Unknown

 

                          Race                      Unknown

 

                          Ethnic Group              Unknown





Author





                          Author                    Saida RAUSCH

 

                          Organization              [facility]

 

                          Address                   [address] [place]  [address]



 

                          Phone                     [phone]







Care Team Providers





                    Care Team Member Name Role                Phone

 

                    SHALOMCHETELE     Unavailable         [phone]







PROBLEMS





          Type      Condition ICD9-CM Code XKH64-WS Code Onset Dates Condition S

tatus SNOMED 

Code

 

          Problem   Obesity (BMI 30.0-34.9)           E66.9               Active

    [id number]

 

          Problem   Abnormal CBC measurement           R79.89              Activ

e    050024484

 

          Problem   Other obesity due to excess calories           E66.09       

       Active    019224213

 

          Problem   Primary insomnia           F51.01              Active    397

2004

 

                Problem         Iron deficiency anemia secondary to inadequate d

ietary iron intake                 

D50.8                                   Active              [id number]

 

                Problem         Migraine without aura and without status migrain

osus, not intractable                 

G43.009                                 Active              617472036

 

          Problem   Chronic fatigue           R53.82              Active    8422

9001

 

          Problem   Body mass index (BMI) of 34.0-34.9 in adult           Z68.34

              Active    193169636







ALLERGIES

No Information



ENCOUNTERS





                Encounter       Location        Date            Diagnosis

 

                          [facility]     [address] [id number]

[address] [place] [address]

                          31 May, 2018              Iron deficiency anemia secon

adriano to inadequate dietary iron intake 

D50.8 ; Migraine without aura and without status migrainosus, not intractable 
G43.009 ; Other obesity due to excess calories E66.09 ; Body mass index (BMI) of
34.0-34.9 in adult Z68.34 ; Elevated fasting glucose R73.01 ; Primary insomnia 
F51.01 and Chronic fatigue R53.82

 

                          [facility]     [address] [facility] [id number]

[address] [place] [address]

                          22 May, 2018              Encounter for Depo-Provera c

ontraception Z30.42

 

                          [facility]     [address] [facility] [id number]

[address] [place] [address]

                                        Encounter for well woman exa

m Z01.419 ; Encounter for Depo-Provera 

contraception Z30.42 ; Obesity (BMI 30.0-34.9) E66.9 and Hyperinsulinemia E16.1

 

                          [place] [address] [address]

                                        Hyperinsulinemia E16.1

 

                          [place] [address] [address]

                                        Hyperinsulinemia E16.1

 

                          [place] [address] [address]

                                        Hyperinsulinemia E16.1 ; Zackery

hayes without aura and without status 

migrainosus, not intractable G43.009 ; Obesity (BMI 30.0-34.9) E66.9 and Iron 
deficiency anemia secondary to inadequate dietary iron intake D50.8

 

                          [place] [address] [address]

                          15 Dec, 2017              Hyperinsulinemia E16.1 ; Zackery

hayes without aura and without status 

migrainosus, not intractable G43.009 ; Obesity (BMI 30.0-34.9) E66.9 ; Iron 
deficiency anemia secondary to inadequate dietary iron intake D50.8 ; Oral 
contraceptive pill surveillance Z30.41 and Dysuria R30.0

 

                          [facility]     [address] [address]

                          15 Dec, 2017              Abnormal CBC R79.89

 

                          [facility]  [address] 

[address]                17 2017              Tachycardia R00.0

 

                          [facility]     [address] [address]

                          17 2017               

 

                          [place] [address] [address]

                          14 Sep, 2017              Hyperinsulinemia E16.1 ; Obe

sity (BMI 30.0-34.9) E66.9 and Migraine

without aura and without status migrainosus, not intractable G43.009

 

                          [place] [address] [place] [address]

                          15 Aug, 2017               

 

                          [facility]  [address] [id number]

[address] [place] [address] 

[address]                10 Aug, 2017              Acute non-recurrent maxillar

y sinusitis J01.00

 

                          [facility]   [address] [facility] [id number]

[address] [place] [phone]

                                        Dental examination Z01.20

 

                          [facility]   [address] [place] [id number] [place] [phone]

                          31 May, 2017              Dental examination Z01.20

 

                          [facility]     [address] [id number]

[address] [place] [address]

                          03 2017              Encounter for well woman exa

m with routine gynecological exam 

Z01.419 ; Encounter for screening for malignant neoplasm of cervix Z12.4 ; 
Encounter for screening breast examination Z12.39 ; Screen for STD (sexually 
transmitted disease) Z11.3 and Encounter for prescription of oral contraceptives
Z30.011

 

                          [facility]   [address] [facility] [id number]

[address] [place] [phone]

                                        Dental examination Z01.20

 

                          [facility]     [id number]

[address] [place] [address]

                          19 Dec, 2016              Abnormal CBC R79.89

 

                          [facility]     [address] [id number]

[address] [place] [address]

                          12 Dec, 2016              Routine health maintenance Z

00.00 ; History of renal calculi 

Z87.442 ; Obesity (BMI 30.0-34.9) E66.9 and Family history of diabetes mellitus 
Z83.3

 

                          [facility]     [address] [id number]

[address] [place] [address]

                          08 Dec, 2016              Routine health maintenance Z

00.00 ; History of renal calculi 

Z87.442 ; Obesity (BMI 30.0-34.9) E66.9 and Family history of diabetes mellitus 
Z83.3

 

                          [facility]  [address] [id number]

[address] [place] [address] 

[address]                28 Sep, 2016              Irritable bowel syndrome wit

h diarrhea K58.0

 

                          [facility]   [address] [id number]

[address] [id number]

                          24 Aug, 2016              Dental examination Z01.20

 

                          [facility]   [address] [id number]

[address] [place] [phone]

                                        Dental caries K02.9

 

                          [facility]   [address] [id number]

[address] [place] [phone]

                          24 May, 2016              Dental examination Z01.20







IMMUNIZATIONS

No Known Immunizations



SOCIAL HISTORY

Never Assessed



REASON FOR VISIT

Lab (walk-in)



PLAN OF CARE





VITAL SIGNS





MEDICATIONS

No Known Medications



RESULTS

No Results



PROCEDURES





                Procedure       Date Ordered    Result          Body Site

 

                LIPID PANEL     2018                     

 

                COMPREHEN METABOLIC PANEL 2018                     

 

                ASSAY THYROID STIM HORMONE 2018                     

 

                COMPLETE CBC W/AUTO DIFF WBC 2018                     

 

                VENIPUNCT, ROUTINE* 2018                     







INSTRUCTIONS





MEDICATIONS ADMINISTERED

No Known Medications



MEDICAL (GENERAL) HISTORY





                    Type                Description         Date

 

                    Medical History     ERCP                 

 

                    Medical History     Renal calculi        

 

                    Medical History     Migraines            

 

                    Surgical History    kidney stone removed  

 

                    Hospitalization History kidney stones        

 

                          Hospitalization History   ER visit- itching all over b

madelin, blurry vision, SOB dxd 

migraine                                2018

## 2020-07-09 NOTE — ED GENERAL
General


Chief Complaint:  Respiratory Problems


Stated Complaint:  SOB, COUGH, FEVER


Nursing Triage Note:  


ARRIVED VIA AMB TO COVID ER.  LANGUAGE LINE USED.  COMPLAINS OF FEVER, SOA, 


COUGH, AND BODY ACHES STARTING ON . PT ALSO STATES HER LUNGS HURT.   


STATES SHE HAS NOT BEEN AROUND ANYONE WITH COVID AND HAS NO CONNECTION TO SCOUPY.  PT'S PULSE OX AFTER WALKING TO BED WAS 91% ON ROOM AIR AND INCREASED TO 


95% AFTER LAYING IN THE BED A COUPLE OF MINUTES.


Nursing Sepsis Screen:  Possible Severe Sepsis Risk


Source of Information:  Patient


Exam Limitations:  No Limitations





History of Present Illness


Date Seen by Provider:  2020


Time Seen by Provider:  12:05


Initial Comments


Here with report of fever, shortness of breath, cough, body aches and just 

overall not feeling well since 20. She did take a friend to the 

clinic last week on Thursday that was short of breath and had fever and likely 

has COVID-19. Denies other contacts. Denies nausea, vomiting or diarrhea. She 

did take DayQuil 2 hours prior to arrival and states her fever has been higher 

than it is now.


Timing/Duration:  4-5 Days


Severity:  Moderate


Associated Systoms:  Chest Pain, Cough, Fever/Chills, Headaches, Malaise; No 

Nausea/Vomiting; Shortness of Air, Weakness





Allergies and Home Medications


Allergies


Coded Allergies:  


     NKANo Known Allergies (Verified  Allergy, Unknown, 06)





Home Medications


Hydrocodone Bit/Acetaminophen 1 Ea Tablet, 1-2 EACH PO Q6H


   Prescribed by: RANDALL NAVA on 18 1134


Metformin HCl 500 Mg Tablet, 500 MG PO BID WITH MEALS, (Reported)


Sulfamethoxazole/Trimethoprim 1 Each Tablet, 1 EACH PO BID


   Prescribed by: RANDALL NAVA on 18 1134





Patient Home Medication List


Home Medication List Reviewed:  Yes





Review of Systems


Review of Systems


Constitutional:  see HPI, chills, fever, malaise


EENTM:  nose congestion; No throat pain


Respiratory:  cough, short of breath; No wheezing


Cardiovascular:  chest pain (Bilateral lower ribs); No palpitations


Gastrointestinal:  No abdominal pain, No nausea, No vomiting


Genitourinary:  no symptoms reported


Musculoskeletal:  muscle pain; No muscle weakness


Skin:  no symptoms reported





All Other Systems Reviewed


Negative Unless Noted:  Yes





Past Medical-Social-Family Hx


Past Med/Social Hx:  Reviewed Nursing Past Med/Soc Hx


Patient Social History


Alcohol Use:  Denies Use


Recreational Drug Use:  No


Smoking Status:  Never a Smoker


Recent Foreign Travel:  No


Contact w/Someone Who Travel:  No


Recent Infectious Disease Expo:  No


Recent Hopitalizations:  Yes





Immunizations Up To Date


Tetanus Booster (TDap):  Less than 5yrs


Date of Influenza Vaccine:  Dec 5, 2013





Past Medical History


Surgeries:  Yes


Appendectomy


Respiratory:  No


Cardiac:  No


Neurological:  No


Reproductive Disorders:  No


Female Reproductive Disorders:  Denies


Sexually Transmitted Disease:  No


HIV/AIDS:  No


Genitourinary:  Yes


Kidney Infection, Kidney Stones


Gastrointestinal:  No


Musculoskeletal:  No


Endocrine:  Yes


Diabetes, Non-Insulin dep


Loss of Vision:  Denies


Hearing Impairment:  Denies


Cancer:  No


Psychosocial:  No


Integumentary:  No


Blood Disorders:  Yes (ANEMIA WITH Previous pregnancy)


Adverse Reaction/Blood Tranf:  No





Family Medical History


Reviewed Nursing Family Hx





Diabetes mellitus


No Pertinent Family Hx





Physical Exam-Suspected Sepsis


Physical Exam


Vital Signs





Vital Signs - First Documented








 20





 11:30


 


Temp 37.8


 


Pulse 111


 


Resp 16


 


B/P (MAP) 134/84 (101)


 


Pulse Ox 95


 


O2 Delivery Room Air





Capillary Refill : Less Than 3 Seconds








Blood Pressure Mean:                    101








Height, Weight, BMI


Height: 5'1.00"


Weight: 180lbs. 0.0oz. 81.803758eo; 35.00 BMI


Method:Stated


General Appearance:  No Apparent Distress, WD/WN


HEENT:  PERRL/EOMI, TMs Normal, Pharyngeal Erythema (Mild)


Neck:  Non Tender, Supple


Respiratory:  Lungs Clear, Normal Breath Sounds


Cardiovascular:  No Murmur, Tachycardia


Gastrointestinal:  Non Tender, Soft


Back:  Normal Inspection, No CVA Tenderness, No Vertebral Tenderness


Extremity:  Normal Range of Motion, Non Tender


Neurologic/Psychiatric:  Alert, Oriented x3


Skin:  normal color, warm/dry





Focused Exam


Lactate Level


20 11:40: Lactic Acid Level 1.34





Lactic Acid Level





Laboratory Tests








Test


 20


11:40


 


Lactic Acid Level


 1.34 MMOL/L


(0.50-2.00)











Progress/Results/Core Measures


Suspected Sepsis


Recent Fever Within 48 Hours:  Yes


Infection Criteria Present:  Suspected New Infection


New/Unexplained  Altered Menta:  No


Sepsis Screen:  Possible Severe Sepsis Risk


SIRS


Temperature: 


Pulse: 111 


Respiratory Rate: 16


 


Laboratory Tests


20 11:40: White Blood Count 6.6


Blood Pressure 134 /84 


Mean: 101


 


20 11:40: Lactic Acid Level 1.34


Laboratory Tests


20 11:40: 


Creatinine 0.77, Platelet Count 222, Total Bilirubin 0.4








Results/Orders


Lab Results





Laboratory Tests








Test


 20


11:40 20


11:50 Range/Units


 


 


White Blood Count


 6.6 


 


 4.3-11.0


10^3/uL


 


Red Blood Count


 4.34 L


 


 4.35-5.85


10^6/uL


 


Hemoglobin 11.8   11.5-16.0  G/DL


 


Hematocrit 35   35-52  %


 


Mean Corpuscular Volume 80   80-99  FL


 


Mean Corpuscular Hemoglobin 27   25-34  PG


 


Mean Corpuscular Hemoglobin


Concent 34 


 


 32-36  G/DL





 


Red Cell Distribution Width 14.6 H  10.0-14.5  %


 


Platelet Count


 222 


 


 130-400


10^3/uL


 


Mean Platelet Volume 10.4   7.4-10.4  FL


 


Neutrophils (%) (Auto) 84 H  42-75  %


 


Lymphocytes (%) (Auto) 13   12-44  %


 


Monocytes (%) (Auto) 3   0-12  %


 


Eosinophils (%) (Auto) 0   0-10  %


 


Basophils (%) (Auto) 0   0-10  %


 


Neutrophils # (Auto) 5.6   1.8-7.8  X 10^3


 


Lymphocytes # (Auto) 0.9 L  1.0-4.0  X 10^3


 


Monocytes # (Auto) 0.2   0.0-1.0  X 10^3


 


Eosinophils # (Auto)


 0.0 


 


 0.0-0.3


10^3/uL


 


Basophils # (Auto)


 0.0 


 


 0.0-0.1


10^3/uL


 


Erythrocyte Sedimentation Rate 88 H  0-20  MM/HR


 


D-Dimer


 < 0.27 


 


 0.00-0.49


UG/ML


 


Sodium Level 138   135-145  MMOL/L


 


Potassium Level 2.7 L  3.6-5.0  MMOL/L


 


Chloride Level 106     MMOL/L


 


Carbon Dioxide Level 18 L  21-32  MMOL/L


 


Anion Gap 14   5-14  MMOL/L


 


Blood Urea Nitrogen 4 L  7-18  MG/DL


 


Creatinine


 0.77 


 


 0.60-1.30


MG/DL


 


Estimat Glomerular Filtration


Rate > 60 


 


  





 


BUN/Creatinine Ratio 5    


 


Glucose Level 207 H    MG/DL


 


Lactic Acid Level


 1.34 


 


 0.50-2.00


MMOL/L


 


Calcium Level 8.7   8.5-10.1  MG/DL


 


Corrected Calcium 8.6   8.5-10.1  MG/DL


 


Total Bilirubin 0.4   0.1-1.0  MG/DL


 


Aspartate Amino Transf


(AST/SGOT) 29 


 


 5-34  U/L





 


Alanine Aminotransferase


(ALT/SGPT) 26 


 


 0-55  U/L





 


Alkaline Phosphatase 93     U/L


 


Lactate Dehydrogenase 304 H  125-220  U/L


 


C-Reactive Protein High


Sensitivity 11.20 H


 


 0.00-0.50


MG/DL


 


Total Protein 7.8   6.4-8.2  GM/DL


 


Albumin 4.1   3.2-4.5  GM/DL


 


Procalcitonin 0.06   <0.10  NG/ML


 


Urine Color  YELLOW   


 


Urine Clarity  CLEAR   


 


Urine pH  6.0  5-9  


 


Urine Specific Gravity  1.020  1.016-1.022  


 


Urine Protein  2+ H NEGATIVE  


 


Urine Glucose (UA)  NEGATIVE  NEGATIVE  


 


Urine Ketones  NEGATIVE  NEGATIVE  


 


Urine Nitrite  NEGATIVE  NEGATIVE  


 


Urine Bilirubin  NEGATIVE  NEGATIVE  


 


Urine Urobilinogen  2.0  < = 1.0  MG/DL


 


Urine Leukocyte Esterase  NEGATIVE  NEGATIVE  


 


Urine RBC (Auto)  NEGATIVE  NEGATIVE  


 


Urine RBC  NONE   /HPF


 


Urine WBC  2-5   /HPF


 


Urine Squamous Epithelial


Cells 


 5-10 


  /HPF





 


Urine Crystals  NONE   /LPF


 


Urine Bacteria  NEGATIVE   /HPF


 


Urine Casts  NONE   /LPF


 


Urine Mucus  NEGATIVE   /LPF


 


Urine Culture Indicated  NO   








My Orders





Orders - TENNILLE MONDRAGON MD


Cbc With Automated Diff (20 11:53)


Comprehensive Metabolic Panel (20 11:53)


Hs C Reactive Protein (20 11:53)


Lactic Acid Analyzer (20 11:53)


Blood Culture (20 11:53)


Chest 1 View, Ap/Pa Only (20 11:53)


Fibrin Degradation Products (20 11:53)


LDH (20 11:53)


Coronavirus Sars-Cov-2 So  (20 11:53)


Ed Iv/Invasive Line Start (20 11:53)


Lactated Ringers (Lr 1000 Ml Iv Solution (20 11:53)


Procalcitonin (Pct) (20 11:55)


Erythrocyte Sedimentation Rate (20 11:55)


Ua Culture If Indicated (20 11:55)


Ketorolac Injection (Toradol Injection) (20 12:17)


Azithromycin Injection (Zithromax Inject (20 12:35)


Ceftriaxone  For Iv Use (Rocephin  For I (20 12:35)


Potassium Chloride (Tablet) (K Dur Table (20 12:45)





Medications Given in ED





Current Medications








 Medications  Dose


 Ordered  Sig/Carol


 Route  Start Time


 Stop Time Status Last Admin


Dose Admin


 


 Lactated Ringer's  1,000 ml @ 


 0 mls/hr  Q0M ONCE


 IV  20 11:53


 20 11:57 DC 20 12:05


1,000 MLS/HR








Vital Signs/I&O











 20





 11:30


 


Temp 37.8


 


Pulse 111


 


Resp 16


 


B/P (MAP) 134/84 (101)


 


Pulse Ox 95


 


O2 Delivery Room Air





Capillary Refill : Less Than 3 Seconds








Blood Pressure Mean:                    101








Progress Note :  


Progress Note


Seen and evaluated. Patient high risk for COVID-19 infection. IV, labs, UA, 

chest x-ray, COVID screening swab ordered. LR 1 L bolus and Toradol 30 mg IV 

ordered. Monitor patient. Bedside urine pregnancy testing negative. 1137: 

Findings are suggesting COVID-19 infection with bilateral pneumonia. Given that 

the patient has low O2 sats with any movement, admission for observation to 

ensure that there is no worsening is appropriate and also we will be unable to 

manage with oxygen if needed. Given the infiltrates we will go ahead and 

initiate antibiotics with Rocephin 1 g IV now and Zithromax 500 mg IV now and 

continue the community acquired pneumonia protocol. Patient was informed to tell

family of the concerns for COVID-19 and that they need to quarantine now. Admit,

observation status. Case was discussed with Dr. Musa who agrees and requests 

observation admission. Patient agrees and appreciates admission. Potassium noted

to be 2.7. KCl 40 mEq by mouth given.





Diagnostic Imaging





   Diagonstic Imaging:  Xray


   Plain Films/CT/US/NM/MRI:  chest


Comments


Bilateral pulmonary infiltrates concerning for COVID-19 infection





NAME:   ELBA WILLSONBEVERLY


MED REC#:   [id number]


ACCOUNT#:   [id number]


PT STATUS:   REG ER


:   1985


PHYSICIAN:   TENNILLE MONDRAGON MD


ADMIT DATE:   20/ER


                                   ***Draft***


Date of Exam:20





CHEST 1 VIEW, AP/PA ONLY








INDICATION: Respiratory distress.





Portable chest 12:27 AM





FINDINGS: There are some scattered patchy alveolar infiltrates in


the lungs. Heart size and pulmonary vascularity are normal. There


are no effusions or pneumothoraces.





IMPRESSION: Patchy alveolar infiltrates suspicious for pneumonia.





  Dictated on workstation # [id number]








Dict:   20 1243


Trans:   20 1246


 [id number]





Interpreted by:     TENNILLE LOCKWOOD MD


Electronically signed by:


   Reviewed:  Reviewed by Me





Departure


Communication (Admissions)


Time/Spoke to Admitting Phy:  12:37





Impression





   Primary Impression:  


   Bilateral pneumonia


   Qualified Codes:  J18.1 - Lobar pneumonia, unspecified organism


   Additional Impressions:  


   COVID-19 evaluation


   Hypokalemia


Disposition:   ADMITTED AS INPATIENT


Condition:  Stable





Admissions


Decision to Admit Reason:  Admit from ER (General)


Decision to Admit/Date:  2020


Time/Decision to Admit Time:  12:37





Departure-Patient Inst.


Referrals:  


[facility] (PCP[facility]Family)


Primary Care Physician











TENNILLE MONDRAGON MD           2020 12:22

## 2020-07-09 NOTE — XMS REPORT
[facility]

                             Created on: 2018



Saida Fermin

External Reference #: [id number]

: 1985

Sex: Female



Demographics





                          Address                   [address] [place]  [address]

 

                          Preferred Language        Unknown

 

                          Marital Status            Unknown

 

                          Gnosticist Affiliation     Unknown

 

                          Race                      Unknown

 

                          Ethnic Group              Unknown





Author





                          Author                    Saida RAUSCH

 

                          Organization              [facility]

 

                          Address                   [address] [place]  [address]



 

                          Phone                     [phone]







Care Team Providers





                    Care Team Member Name Role                Phone

 

                    RAUSCHCHET HAASELE     Unavailable         [phone]







PROBLEMS





          Type      Condition ICD9-CM Code LIZ50-UN Code Onset Dates Condition S

tatus SNOMED 

Code

 

          Problem   Obesity (BMI 30.0-34.9)           E66.9               Active

    [id number]

 

          Problem   Abnormal CBC measurement           R79.89              Activ

e    [id number]

 

          Problem   Other obesity due to excess calories           E66.09       

       Active    [id number]

 

          Problem   Primary insomnia           F51.01              Active    397

2004

 

                Problem         Iron deficiency anemia secondary to inadequate d

ietary iron intake                 

D50.8                                   Active              [id number]

 

                Problem         Migraine without aura and without status migrain

osus, not intractable                 

G43.009                                 Active              [id number]

 

          Problem   Chronic fatigue           R53.82              Active    8422

9001

 

          Problem   Body mass index (BMI) of 34.0-34.9 in adult           Z68.34

              Active    [id number]







ALLERGIES

No Known Allergies



ENCOUNTERS





                Encounter       Location        Date            Diagnosis

 

                          [facility]     [address] [id number]

[address] [place] [address]

                          31 May, 2018              Iron deficiency anemia secon

adriano to inadequate dietary iron intake 

D50.8 ; Migraine without aura and without status migrainosus, not intractable 
G43.009 ; Other obesity due to excess calories E66.09 ; Body mass index (BMI) of
34.0-34.9 in adult Z68.34 ; Elevated fasting glucose R73.01 ; Primary insomnia 
F51.01 and Chronic fatigue R53.82

 

                          [facility]     [address] [facility] [id number]

[address] [place] [address]

                          22 May, 2018              Encounter for Depo-Provera c

ontraception Z30.42

 

                          [facility]     [address] [facility] [id number]

[address] [place] [address]

                          20 2018              Encounter for well woman exa

m Z01.419 ; Encounter for Depo-Provera 

contraception Z30.42 ; Obesity (BMI 30.0-34.9) E66.9 and Hyperinsulinemia E16.1

 

                          [place] [address] [address]

                                        Hyperinsulinemia E16.1

 

                          [place] [address] [address]

                                        Hyperinsulinemia E16.1

 

                          [place] [address] [address]

                                        Hyperinsulinemia E16.1 ; Zackery

hayes without aura and without status 

migrainosus, not intractable G43.009 ; Obesity (BMI 30.0-34.9) E66.9 and Iron 
deficiency anemia secondary to inadequate dietary iron intake D50.8

 

                          [place] [address] [address]

                          15 Dec, 2017              Hyperinsulinemia E16.1 ; Zackery

hayes without aura and without status 

migrainosus, not intractable G43.009 ; Obesity (BMI 30.0-34.9) E66.9 ; Iron 
deficiency anemia secondary to inadequate dietary iron intake D50.8 ; Oral 
contraceptive pill surveillance Z30.41 and Dysuria R30.0

 

                          [facility]     [address] [address]

                          15 Dec, 2017              Abnormal CBC R79.89

 

                          [facility]  [address] 

[phone]                17 2017              Tachycardia R00.0

 

                          [facility]     [address] [phone]

                                         

 

                          [place] [address] [address]

                          14 Sep, 2017              Hyperinsulinemia E16.1 ; Obe

sity (BMI 30.0-34.9) E66.9 and Migraine

without aura and without status migrainosus, not intractable G43.009

 

                          [place] [address] [place] [address]

                          15 Aug, 2017               

 

                          [facility]  [address] [id number]

[address] [place] [address] 

[address]                10 Aug, 2017              Acute non-recurrent maxillar

y sinusitis J01.00

 

                          [facility]   [address] [id number]

[address] [place] [id number]

                                        Dental examination Z01.20

 

                          [facility]   [id number] [place] [id number]

                          31 May, 2017              Dental examination Z01.20

 

                          [facility]     [address] [id number]

[address] [place] [address]

                          03 2017              Encounter for well woman exa

m with routine gynecological exam 

Z01.419 ; Encounter for screening for malignant neoplasm of cervix Z12.4 ; 
Encounter for screening breast examination Z12.39 ; Screen for STD (sexually 
transmitted disease) Z11.3 and Encounter for prescription of oral contraceptives
Z30.011

 

                          [facility]   [address] [facility] [id number]

[address] [place] [id number]

                                        Dental examination Z01.20

 

                          [facility]     [address] [id number]

[address] [place] [phone]

                          19 Dec, 2016              Abnormal CBC R79.89

 

                          [facility]     [address] [id number]

[address] [place] [address]

                          12 Dec, 2016              Routine health maintenance Z

00.00 ; History of renal calculi 

Z87.442 ; Obesity (BMI 30.0-34.9) E66.9 and Family history of diabetes mellitus 
Z83.3

 

                          [facility]     [address] [id number]

[address] [place] [address]

                          08 Dec, 2016              Routine health maintenance Z

00.00 ; History of renal calculi 

Z87.442 ; Obesity (BMI 30.0-34.9) E66.9 and Family history of diabetes mellitus 
Z83.3

 

                          [facility]  [address] [id number]

[address] [place] [address] 

[phone]                28 Sep, 2016              Irritable bowel syndrome wit

h diarrhea K58.0

 

                          [facility]   [address] [id number]

[address] [id number]

                          24 Aug, 2016              Dental examination Z01.20

 

                          [facility]   [address] [id number]

[address] [place] [phone]

                                        Dental caries K02.9

 

                          [facility]   [address] [id number]

[address] [place] [id number]

                          24 May, 2016              Dental examination Z01.20







IMMUNIZATIONS





                Vaccine         Route           Administration Date Status

 

                DEPO PROVERA (150 MG/ML) IM Intramuscular 2018    Admini

stered







SOCIAL HISTORY

Never Assessed



REASON FOR VISIT

Annual physical (female)--tjanssenMA, --normal pap last year, birth control refi
ll , --eats chinese foods then has the episodes of sob, itching all over body an
d dizziness. 



PLAN OF CARE





                          Activity                  Details

 

                                         

 

                          Follow Up                 3 Months, prn Reason:







VITAL SIGNS





                    Height              61 in               2018

 

                    Weight              177.7 lbs           2018

 

                    Temperature         98.0 degrees Fahrenheit 2018

 

                    Heart Rate          78 bpm              2018

 

                    Respiratory Rate    20                  2018

 

                    BMI                 33.57 kg/m2         2018

 

                    Blood pressure systolic 112 mmHg            2018

 

                    Blood pressure diastolic 74 mmHg             2018







MEDICATIONS





        Medication Instructions Dosage  Frequency Start Date End Date Duration S

nadine

 

        Mononessa 0.25-35 MG- Orally Once a day 1 tablet 24h                    

 28      Not-Taking

 

        Multi For Her -                                                 Active

 

        Metformin HCl 500 mg Orally 2 times a day 1 tablet 12h     19 Dec, 2016 

        90 days 

Active

 

          Mononessa 0.25-35 MG-MCG Orally Once a day 1 tablet  24h       03 2017           28 day(s)

                                        Active







RESULTS

No Results



PROCEDURES





                Procedure       Date Ordered    Result          Body Site

 

                URINE PREGNANCY TEST 2018                     

 

                No Charge       2018                     

 

                THER/PROPH/DIAG INJ, SC/IM 2018                     

 

                Bacterial Vaginosis In House 2018                     

 

                TRICHOMONAS ASSAY W/OPTIC 2018                     

 

                DEPO PROVERA (150 MG/ML) 2018                     

 

                CULTURE, BACTERIA, OTHER 2018                     







INSTRUCTIONS





MEDICATIONS ADMINISTERED

No Known Medications



MEDICAL (GENERAL) HISTORY





                    Type                Description         Date

 

                    Medical History     ERCP                 

 

                    Medical History     Renal calculi        

 

                    Medical History     Migraines            

 

                    Surgical History    kidney stone removed  

 

                    Hospitalization History kidney stones        

 

                          Hospitalization History   ER visit- itching all over b

madelin, blurry vision, SOB dxd 

migraine                                2018

## 2020-07-09 NOTE — XMS REPORT
Continuity of Care Document

                             Created on: 2020



Zander Garibay, Saida

External Reference #: [id number]

: 1985

Sex: Female



Demographics





                          Address                   [address] [place]  [address]

 

                          Home Phone                [phone] x

 

                          Preferred Language        Unknown

 

                          Marital Status            Unknown

 

                          Jainism Affiliation     Unknown

 

                          Race                      Unknown

 

                          Ethnic Group              Unknown





Author





                          Organization              Unknown

 

                          Address                   Unknown

 

                          Phone                     Unavailable



              



Allergies

      



             Active           Description           Code           Type         

  Severity   

                Reaction           Onset           Reported/Identified          

 

Relationship to Patient                 Clinical Status        

 

                Yes             NKANo Known Allergies           NKA             

Miscellaneous 

Allergy           Unknown           N/A                             2006  

      

                                                             



                  



Medications

      



There is no data.                  



Problems

      



             Date Dx Coded           Attending           Type           Code    

       

Diagnosis                               Diagnosed By        

 

             2013           DEANA ALMEIDA MD           Ot           650  

         

NORMAL DELIVERY                                  

 

             2013           DEANA ALMEIDA MD           Ot           V27.0

           

DELIVER-SINGLE LIVEBORN                          

 

                2013           SHELLI PHIPPS           Ot         

     [phone]         

                          CALCULUS OF KIDNEY                    

 

                2013           SHELLI PHIPPS           Ot         

     [phone]        

                          ABDOMINAL PAIN, OTHER SPECIFIED SITE                  

  

 

             2014           DEANA ALMEIDA MD           Ot           592.0

           

CALCULUS OF KIDNEY                               

 

             2014           DEANA ALMEIDA MD           Ot           592.1

           

CALCULUS OF URETER                               

 

             2018           ITALO BECKMAN MD           Ot           D64.

9           

ANEMIA, UNSPECIFIED                              

 

                2018           ITALO BECKMAN MD           Ot            

  H53.123          

                          TRANSIENT VISUAL LOSS, BILATERAL                    

 

             2018           ITALO BECKMAN MD           Ot           R51 

          

HEADACHE                                         

 

             2018           ITALO BECKMAN MD           Ot           Z79.

84           

LONG TERM (CURRENT) USE OF ORAL HYPOGLYC                    

 

                2018           ITALO BECKMAN MD           Ot            

  Z87.442          

                          PERSONAL HISTORY OF URINARY CALCULI                   

 

 

                2018           ITALO BECKMAN MD           Ot            

  Z87.448          

                          PERSONAL HISTORY OF OTHER DISEASES OF UR              

      

 

             2018           ITALO BECKMAN MD           Ot           Z88.

1           

ALLERGY STATUS TO OTHER ANTIBIOTIC AGENT                    

 

             2018                        Ot           285.9           ANEM

IA NOS        

                                                 

 

             2018                        Ot           648.23           ANE

FLOWER-ANTEPARTUM

                                                 

 

             2018                        Ot           649.63           Habematolel

RINE SIZE DATE

DISCREPANCY, ANTEPARTU                           

 

             2018           DEANA ALMEIDA MD           Ot           649.6

3           

UTERINE SIZE DATE DISCREPANCY, ANTEPARTU                    

 

             2018           DEANA ALMEIDA MD           Ot           V28.8

9           

OTHER SPECIFIED  SCREENING                    

 

             2018           DEANA ALMEIDA MD           Ot           592.0

           

CALCULUS OF KIDNEY                               

 

             2018           DEANA ALMEIDA MD           Ot           789.0

9           

ABDOMINAL PAIN, OTHER SPECIFIED SITE                    

 

             2018           DEANA ALMEIDA MD           Ot           620.2

           

OVARIAN CYST NEC/NOS                             

 

             2018           DEANA ALMEIDA MD           Ot           625.9

           

FEM GENITAL SYMPTOMS NOS                         

 

             2018                        Ot           285.9           ANEM

IA NOS        

                                                 

 

             2018                        Ot           648.23           ANE

FLOWER-ANTEPARTUM

                                                 

 

             2018                        Ot           649.63           Habematolel

RINE SIZE DATE

DISCREPANCY, ANTEPARTU                           

 

             2018           DEANA ALMEIDA MD           Ot           649.6

3           

UTERINE SIZE DATE DISCREPANCY, ANTEPARTU                    

 

             2018           DEANA ALMEIDA MD           Ot           V28.8

9           

OTHER SPECIFIED  SCREENING                    

 

             2018           DEANA ALMEIDA MD           Ot           592.0

           

CALCULUS OF KIDNEY                               

 

             2018           DEANA ALMEIDA MD           Ot           789.0

9           

ABDOMINAL PAIN, OTHER SPECIFIED SITE                    

 

             2018           DEANA ALMEIDA MD           Ot           620.2

           

OVARIAN CYST NEC/NOS                             

 

             2018           DEANA ALMEIDA MD           Ot           625.9

           

FEM GENITAL SYMPTOMS NOS                         

 

             2018           ITALO BECKMAN MD           Ot           D64.

9           

ANEMIA, UNSPECIFIED                              

 

                2018           KARUNA VUONG, ITALO JESUS           Ot            

  H53.123          

                          TRANSIENT VISUAL LOSS, BILATERAL                    

 

             2018           ITALO BECKMAN MD           Ot           R51 

          

HEADACHE                                         

 

             2018           ITALO BECKMAN MD           Ot           Z79.

84           

LONG TERM (CURRENT) USE OF ORAL HYPOGLYC                    

 

                2018           ITALO BECKMAN MD           Ot            

  Z87.442          

                          PERSONAL HISTORY OF URINARY CALCULI                   

 

 

                2018           ITALO BECKMAN MD           Ot            

  Z87.448          

                          PERSONAL HISTORY OF OTHER DISEASES OF UR              

      

 

             2018           ITALO BECKMAN MD           Ot           Z88.

1           

ALLERGY STATUS TO OTHER ANTIBIOTIC AGENT                    

 

             2018                        Ot           649.63           Habematolel

RINE SIZE DATE

DISCREPANCY, ANTEPARTU                           

 

             2018           DEANA ALMEIDA MD           Ot           649.6

3           

UTERINE SIZE DATE DISCREPANCY, ANTEPARTU                    

 

             2018           DEANA ALMEIDA MD           Ot           V28.8

9           

OTHER SPECIFIED  SCREENING                    

 

             2018           DEANA ALMEIDA MD           Ot           592.0

           

CALCULUS OF KIDNEY                               

 

             2018           DEANA ALMEIDA MD           Ot           789.0

9           

ABDOMINAL PAIN, OTHER SPECIFIED SITE                    

 

             2018           ELLE VUONG, DEANA JESUS           Ot           620.2

           

OVARIAN CYST NEC/NOS                             

 

             2018           ELLE VUONG, DEANA JESUS           Ot           625.9

           

FEM GENITAL SYMPTOMS NOS                         

 

             2018                        Ot           649.63           Habematolel

RINE SIZE DATE

DISCREPANCY, ANTEPARTU                           

 

             2018           DEANA ALMEIDA MD           Ot           649.6

3           

UTERINE SIZE DATE DISCREPANCY, ANTEPARTU                    

 

             2018           DEANA ALMEIDA MD           Ot           V28.8

9           

OTHER SPECIFIED  SCREENING                    

 

             2018           ELLE VUONG, DEANA JESUS           Ot           592.0

           

CALCULUS OF KIDNEY                               

 

             2018           DEANA ALMEIDA MD           Ot           789.0

9           

ABDOMINAL PAIN, OTHER SPECIFIED SITE                    

 

             2018           DEANA ALMEIDA MD           Ot           620.2

           

OVARIAN CYST NEC/NOS                             

 

             2018           ELLE VUONG, DEANA JESUS           Ot           625.9

           

FEM GENITAL SYMPTOMS NOS                         

 

                2018           ZOIE JI           Ot          

    K38.1          

                          APPENDICULAR CONCRETIONS                    

 

             2018           RANDALL NAVA MD           Ot           E11.9 

          

TYPE 2 DIABETES MELLITUS WITHOUT COMPLIC                    

 

             2018           RANDALL NAVA MD           Ot           K35.80

           

UNSPECIFIED ACUTE APPENDICITIS                    

 

             2018           RANDALL NAVA MD           Ot           Z79.84

           

LONG TERM (CURRENT) USE OF ORAL HYPOGLYC                    

 

                2018           ZOIE JI           Ot          

    K38.1          

                          APPENDICULAR CONCRETIONS                    

 

             2018           RANDALL NAVA MD           Ot           E11.9 

          

TYPE 2 DIABETES MELLITUS WITHOUT COMPLIC                    

 

             2018           RANDALL NAVA MD           Ot           K35.80

           

UNSPECIFIED ACUTE APPENDICITIS                    

 

             2018           RANDALL NAVA MD           Ot           Z79.84

           

LONG TERM (CURRENT) USE OF ORAL HYPOGLYC                    

 

             2018           RANDALL NAVA MD           Ot           E11.9 

          

TYPE 2 DIABETES MELLITUS WITHOUT COMPLIC                    

 

             2018           RANDALL NAVA MD           Ot           K35.80

           

UNSPECIFIED ACUTE APPENDICITIS                    

 

             2018           RANDALL NAVA MD           Ot           Z79.84

           

LONG TERM (CURRENT) USE OF ORAL HYPOGLYC                    

 

                2018           ZOIE JI           Ot          

    K38.1          

                          APPENDICULAR CONCRETIONS                    



                                                                                
                                                               



Procedures

      



                Code            Description           Performed By           Per

formed On        

 

                                      73.4                                 MEDIC

AL INDUCTION LABOR        

                                                    2013        

 

                                      73.59                                 MANU

AL ASSIST DELIV NEC       

                                                    2013        

 

                                      [id number]                                 RE

SECTION OF APPENDIX, 

PERCUTANEOUS ENDO                                               2018      

  



                      



Results

      



                    Test                Result              Range        

 

                                        ANEMIA PANEL - 12/15/17 09:14         

 

                    IRON, TOTAL           72 mcg/dL                   

 

                    FERRITIN            7 ng/mL                     

 

                    IRON BINDING CAPACITY           443 mcg/dL (calc)           

250-450        

 

                    % SATURATION           16 % (calc)           11-50        

 

                                        CBC - 12/15/17 09:14         

 

                    MESSAGE:                                NRG        

 

                    CONTAINER TYPE:           LAVENDER            NRG        

 

                                        THYROID ANALYZER - 18 08:12       

  

 

                    TSH                 1.55 mIU/L           NRG        

 

                                        INSULIN LEVEL - 18 12:34         

 

                    INSULIN             7.9 uIU/mL            2.0-19.6        

 

                                        Capillary blood glucose measurement by g

lucometer (mass/volume) - 18 15:59

        

 

                          Capillary blood glucose measurement by glucometer (mas

s/volume)           107 

mg/dL                                           

 

                                        Urine drug screening test - 18 16:

30         

 

                    Urine phencyclidine detection by screening method           

NEGATIVE            

NEGATIVE        

 

                          Urine benzodiazepines detection by screening method   

        NEGATIVE          

                                        NEGATIVE        

 

                    Urine cocaine detection           NEGATIVE            NEGATI

VE        

 

                    Urine amphetamines detection by screening method           N

EGATIVE            

NEGATIVE        

 

                          Urine methamphetamine detection by screening method   

        NEGATIVE          

                                        NEGATIVE        

 

                    Urine cannabinoids detection by screening method           N

EGATIVE            

NEGATIVE        

 

                    Urine opiates detection by screening method           NEGATI

VE            

NEGATIVE        

 

                    Urine barbiturates detection           NEGATIVE            N

EGATIVE        

 

                          Screening urine tricyclic antidepressants detection   

        NEGATIVE          

                                        NEGATIVE        

 

                    Urine methadone detection by screening method           NEGA

TIVE            

NEGATIVE        

 

                    Urine oxycodone detection           NEGATIVE            NEGA

TIVE        

 

                    Urine propoxyphene detection           NEGATIVE            N

EGATIVE        

 

                                        Complete urinalysis with reflex to cultu

re - 18 16:30         

 

                    Urine color determination           YELLOW              NRG 

       

 

                    Urine clarity determination           VERY CLOUDY           

 NRG        

 

                    Urine pH measurement by test strip           6.5            

     5-9        

 

                    Specific gravity of urine by test strip           1.020     

          1.016-1.022  

     

 

                    Urine protein assay by test strip, semi-quantitative        

   3+                  

NEGATIVE        

 

                    Urine glucose detection by automated test strip           NE

GATIVE            

NEGATIVE        

 

                          Erythrocytes detection in urine sediment by light micr

oscopy           5+       

                                        NEGATIVE        

 

                    Urine ketones detection by automated test strip           NE

GATIVE            

NEGATIVE        

 

                    Urine nitrite detection by test strip           NEGATIVE    

        NEGATIVE    

   

 

                    Urine total bilirubin detection by test strip           NEGA

TIVE            

NEGATIVE        

 

                          Urine urobilinogen measurement by automated test strip

 (mass/volume)           1

mg/dL                                   NORMAL        

 

                    Urine leukocyte esterase detection by dipstick           1+ 

                 NEGATIVE 

      

 

                                        Automated urine sediment erythrocyte cou

nt by microscopy (number/high power 

field)                    > [HPF]                   NRG        

 

                                        Automated urine sediment leukocyte count

 by microscopy (number/high power field)

                           [HPF]                    NRG        

 

                          Bacteria detection in urine sediment by light microsco

py           NEGATIVE     

                                        NRG        

 

                                        Squamous epithelial cells detection in u

rine sediment by light microscopy       

                          0-2                       NRG        

 

                          Crystals detection in urine sediment by light microsco

py           NONE         

                                        NRG        

 

                    Casts detection in urine sediment by light microscopy       

    NONE                

NRG        

 

                          Mucus detection in urine sediment by light microscopy 

          SMALL           

                                        NRG        

 

                    Complete urinalysis with reflex to culture           NO     

             NRG        

 

                                        Complete blood count (CBC) with automate

d white blood cell (WBC) differential - 

18 16:41         

 

                          Blood leukocytes automated count (number/volume)      

     8.9 10*3/uL          

                                        4.3-11.0        

 

                          Blood erythrocytes automated count (number/volume)    

       4.09 10*6/uL       

                                        4.35-5.85        

 

                    Venous blood hemoglobin measurement (mass/volume)           

11.1 g/dL           

11.5-16.0        

 

                    Blood hematocrit (volume fraction)           34 %           

     35-52        

 

                    Automated erythrocyte mean corpuscular volume           82 [

foz_us]           

80-99        

 

                                        Automated erythrocyte mean corpuscular h

emoglobin (mass per erythrocyte)        

                          27 pg                     25-34        

 

                                        Automated erythrocyte mean corpuscular h

emoglobin concentration measurement 

(mass/volume)             33 g/dL                   32-36        

 

                    Automated erythrocyte distribution width ratio           14.

2 %              10.0-

14.5        

 

                    Automated blood platelet count (count/volume)           259 

10*3/uL           

130-400        

 

                          Automated blood platelet mean volume measurement      

     11.1 [foz_us]        

                                        7.4-10.4        

 

                    Automated blood neutrophils/100 leukocytes           82 %   

             42-75       

 

 

                    Automated blood lymphocytes/100 leukocytes           15 %   

             12-44       

 

 

                    Blood monocytes/100 leukocytes           2 %                

 0-12        

 

                    Automated blood eosinophils/100 leukocytes           1 %    

             0-10        

 

                    Automated blood basophils/100 leukocytes           0 %      

           0-10        

 

                    Blood neutrophils automated count (number/volume)           

7.2 10*3            

1.8-7.8        

 

                    Blood lymphocytes automated count (number/volume)           

1.3 10*3            

1.0-4.0        

 

                    Blood monocytes automated count (number/volume)           0.

2 10*3            

0.0-1.0        

 

                    Automated eosinophil count           0.1 10*3/uL           0

.0-0.3        

 

                    Automated blood basophil count (count/volume)           0.0 

10*3/uL           

0.0-0.1        

 

                                        Comprehensive metabolic panel - 18

 16:41         

 

                          Serum or plasma sodium measurement (moles/volume)     

      140 mmol/L          

                                        135-145        

 

                          Serum or plasma potassium measurement (moles/volume)  

         3.8 mmol/L       

                                        3.6-5.0        

 

                          Serum or plasma chloride measurement (moles/volume)   

        108 mmol/L        

                                                

 

                    Carbon dioxide           18 mmol/L           21-32        

 

                          Serum or plasma anion gap determination (moles/volume)

           14 mmol/L      

                                        5-14        

 

                          Serum or plasma urea nitrogen measurement (mass/volume

)           14 mg/dL      

                                        7-18        

 

                          Serum or plasma creatinine measurement (mass/volume)  

         0.69 mg/dL       

                                        0.60-1.30        

 

                    Serum or plasma urea nitrogen/creatinine mass ratio         

  20                  NRG 

       

 

                                        Serum or plasma creatinine measurement w

ith calculation of estimated glomerular 

filtration rate           >                         NRG        

 

                    Serum or plasma glucose measurement (mass/volume)           

113 mg/dL           

        

 

                    Serum or plasma calcium measurement (mass/volume)           

8.4 mg/dL           

8.5-10.1        

 

                          Serum or plasma total bilirubin measurement (mass/volu

me)           0.2 mg/dL   

                                        0.1-1.0        

 

                                        Serum or plasma alkaline phosphatase gene

surement (enzymatic activity/volume)    

                          69 U/L                            

 

                                        Serum or plasma aspartate aminotransfera

se measurement (enzymatic 

activity/volume)           18 U/L                    5-34        

 

                                        Serum or plasma alanine aminotransferase

 measurement (enzymatic activity/volume)

                          14 U/L                    0-55        

 

                    Serum or plasma protein measurement (mass/volume)           

7.2 g/dL            

6.4-8.2        

 

                    Serum or plasma albumin measurement (mass/volume)           

3.6 g/dL            

3.2-4.5        

 

                                        Serum or plasma C reactive protein measu

rement (mass/volume) - 18 16:41   

      

 

                          Serum or plasma C reactive protein measurement (mass/v

olume)           0.96 

mg/dL                                   0.00-0.50        

 

                                        Erythrocyte sedimentation rate by willy

gren method - 18 16:41         

 

                    Erythrocyte sedimentation rate by westergren method         

  36 mm               0-

20        

 

                                        Serum or plasma ethanol measurement (mas

s/volume) - 18 16:41         

 

                    Serum or plasma ethanol measurement (mass/volume)           

< mg/dL             

<10        

 

                                        CULTURE, GENITAL - 18 16:36       

  

 

                    CULTURE, GENITAL           SEE NOTE            NRG        

 

                                        Complete blood count (CBC) with automate

d white blood cell (WBC) differential - 

18 18:15         

 

                          Blood leukocytes automated count (number/volume)      

     12.2 10*3/uL         

                                        4.3-11.0        

 

                          Blood erythrocytes automated count (number/volume)    

       4.15 10*6/uL       

                                        4.35-5.85        

 

                    Venous blood hemoglobin measurement (mass/volume)           

11.1 g/dL           

11.5-16.0        

 

                    Blood hematocrit (volume fraction)           34 %           

     35-52        

 

                    Automated erythrocyte mean corpuscular volume           81 [

foz_us]           

80-99        

 

                                        Automated erythrocyte mean corpuscular h

emoglobin (mass per erythrocyte)        

                          27 pg                     25-34        

 

                                        Automated erythrocyte mean corpuscular h

emoglobin concentration measurement 

(mass/volume)             33 g/dL                   32-36        

 

                    Automated erythrocyte distribution width ratio           14.

3 %              10.0-

14.5        

 

                    Automated blood platelet count (count/volume)           297 

10*3/uL           

130-400        

 

                          Automated blood platelet mean volume measurement      

     10.8 [foz_us]        

                                        7.4-10.4        

 

                    Automated blood neutrophils/100 leukocytes           76 %   

             42-75       

 

 

                    Automated blood lymphocytes/100 leukocytes           16 %   

             12-44       

 

 

                    Blood monocytes/100 leukocytes           6 %                

 0-12        

 

                    Automated blood eosinophils/100 leukocytes           2 %    

             0-10        

 

                    Automated blood basophils/100 leukocytes           0 %      

           0-10        

 

                    Blood neutrophils automated count (number/volume)           

9.2 10*3            

1.8-7.8        

 

                    Blood lymphocytes automated count (number/volume)           

2.0 10*3            

1.0-4.0        

 

                    Blood monocytes automated count (number/volume)           0.

7 10*3            

0.0-1.0        

 

                    Automated eosinophil count           0.2 10*3/uL           0

.0-0.3        

 

                    Automated blood basophil count (count/volume)           0.0 

10*3/uL           

0.0-0.1        

 

                                        Whole blood basic metabolic panel -  18:15         

 

                          Serum or plasma sodium measurement (moles/volume)     

      139 mmol/L          

                                        135-145        

 

                          Serum or plasma potassium measurement (moles/volume)  

         4.0 mmol/L       

                                        3.6-5.0        

 

                          Serum or plasma chloride measurement (moles/volume)   

        109 mmol/L        

                                                

 

                    Carbon dioxide           21 mmol/L           21-32        

 

                          Serum or plasma anion gap determination (moles/volume)

           9 mmol/L       

                                        5-14        

 

                          Serum or plasma urea nitrogen measurement (mass/volume

)           10 mg/dL      

                                        7-18        

 

                          Serum or plasma creatinine measurement (mass/volume)  

         0.65 mg/dL       

                                        0.60-1.30        

 

                    Serum or plasma urea nitrogen/creatinine mass ratio         

  15                  NRG 

       

 

                                        Serum or plasma creatinine measurement w

ith calculation of estimated glomerular 

filtration rate           >                         NRG        

 

                    Serum or plasma glucose measurement (mass/volume)           

106 mg/dL           

        

 

                    Serum or plasma calcium measurement (mass/volume)           

9.4 mg/dL           

8.5-10.1        

 

                                        Methicillin resistant Staphylococcus aur

eus (MRSA) screening culture - 18 

04:20         

 

                          Methicillin resistant Staphylococcus aureus (MRSA) scr

eening culture           

NEG                                     NRG        

 

                                        Hemoglobin A1c - 18 07:59         

 

                    Blood hemoglobin A1C measurement (mass/volume)           5.5

 %               4.0-5.6

        

 

                    MEAN BLOOD GLUCOSE           111 %               <=126      

  

 

                                        Urine beta human chorionic gonadotropin 

(hCG) measurement - 18 10:23      

   

 

                          Urine beta human chorionic gonadotropin (hCG) measurem

ent           NEGATIVE    

                                        NEGATIVE        

 

                                        VITAMIN D, 25-H - 18 10:25        

 

 

                    VITAMIN D,25-OH,TOTAL,IA           17 ng/mL            30-10

0        

 

                                        TSH - 19 11:39         

 

                    TSH                 0.89 mIU/L           NRG        

 

                                        CULTURE, URINE - 19 13:56         

 

                    CULTURE, URINE, ROUTINE           SEE NOTE            NRG   

     

 

                                        SUREPATH PAP AND HPV mRNA E6/E7 -  12:36         

 

                    CLINICAL INFORMATION:                               NRG     

   

 

                    LMP:                                    NRG        

 

                    PREV. PAP:                               NRG        

 

                    PREV. BX:                               NRG        

 

                    SOURCE:                                 NRG        

 

                    STATEMENT OF ADEQUACY:                               NRG    

    

 

                    INTERPRETATION/RESULT:                               NRG    

    

 

                    CYTOTECHNOLOGIST:                               NRG        

 

                    HPV mRNA E6/E7, SUREPATH VIAL           Not Detected        

    NOT DETECTED    

    

 

                    COMMENT                                 NRG        

 

                                        LIPID PANEL - 20 09:28         

 

                    CHOLESTEROL, TOTAL           188 mg/dL           <200       

 

 

                    HDL CHOLESTEROL           40 mg/dL            > OR = 50     

   

 

                    TRIGLYCERIDES           151 mg/dL           <150        

 

                    LDL-CHOLESTEROL           122 mg/dL (calc)           NRG    

    

 

                    CHOL/HDLC RATIO           4.7 (calc)           <5.0        

 

                    NON HDL CHOLESTEROL           148 mg/dL (calc)           <13

0        

 

                                        Complete blood count (CBC) with automate

d white blood cell (WBC) differential - 

20 11:40         

 

                          Blood leukocytes automated count (number/volume)      

     6.6 10*3/uL          

                                        4.3-11.0        

 

                          Blood erythrocytes automated count (number/volume)    

       4.34 10*6/uL       

                                        4.35-5.85        

 

                    Venous blood hemoglobin measurement (mass/volume)           

11.8 g/dL           

11.5-16.0        

 

                    Blood hematocrit (volume fraction)           35 %           

     35-52        

 

                    Automated erythrocyte mean corpuscular volume           80 [

foz_us]           

80-99        

 

                                        Automated erythrocyte mean corpuscular h

emoglobin (mass per erythrocyte)        

                          27 pg                     25-34        

 

                                        Automated erythrocyte mean corpuscular h

emoglobin concentration measurement 

(mass/volume)             34 g/dL                   32-36        

 

                    Automated erythrocyte distribution width ratio           14.

6 %              10.0-

14.5        

 

                    Automated blood platelet count (count/volume)           222 

10*3/uL           

130-400        

 

                          Automated blood platelet mean volume measurement      

     10.4 [foz_us]        

                                        7.4-10.4        

 

                    Automated blood neutrophils/100 leukocytes           84 %   

             42-75       

 

 

                    Automated blood lymphocytes/100 leukocytes           13 %   

             12-44       

 

 

                    Blood monocytes/100 leukocytes           3 %                

 0-12        

 

                    Automated blood eosinophils/100 leukocytes           0 %    

             0-10        

 

                    Automated blood basophils/100 leukocytes           0 %      

           0-10        

 

                    Blood neutrophils automated count (number/volume)           

5.6 10*3            

1.8-7.8        

 

                    Blood lymphocytes automated count (number/volume)           

0.9 10*3            

1.0-4.0        

 

                    Blood monocytes automated count (number/volume)           0.

2 10*3            

0.0-1.0        

 

                    Automated eosinophil count           0.0 10*3/uL           0

.0-0.3        

 

                    Automated blood basophil count (count/volume)           0.0 

10*3/uL           

0.0-0.1        

 

                                        Comprehensive metabolic panel - 20

 11:40         

 

                          Serum or plasma sodium measurement (moles/volume)     

      138 mmol/L          

                                        135-145        

 

                          Serum or plasma potassium measurement (moles/volume)  

         2.7 mmol/L       

                                        3.6-5.0        

 

                          Serum or plasma chloride measurement (moles/volume)   

        106 mmol/L        

                                                

 

                    Carbon dioxide           18 mmol/L           21-32        

 

                          Serum or plasma anion gap determination (moles/volume)

           14 mmol/L      

                                        5-14        

 

                          Serum or plasma urea nitrogen measurement (mass/volume

)           4 mg/dL       

                                        7-18        

 

                          Serum or plasma creatinine measurement (mass/volume)  

         0.77 mg/dL       

                                        0.60-1.30        

 

                    Serum or plasma urea nitrogen/creatinine mass ratio         

  5                   NRG  

      

 

                                        Serum or plasma creatinine measurement w

ith calculation of estimated glomerular 

filtration rate           >                         NRG        

 

                    Serum or plasma glucose measurement (mass/volume)           

207 mg/dL           

        

 

                    Serum or plasma calcium measurement (mass/volume)           

8.7 mg/dL           

8.5-10.1        

 

                          Serum or plasma total bilirubin measurement (mass/volu

me)           0.4 mg/dL   

                                        0.1-1.0        

 

                                        Serum or plasma alkaline phosphatase gene

surement (enzymatic activity/volume)    

                          93 U/L                            

 

                                        Serum or plasma aspartate aminotransfera

se measurement (enzymatic 

activity/volume)           29 U/L                    5-34        

 

                                        Serum or plasma alanine aminotransferase

 measurement (enzymatic activity/volume)

                          26 U/L                    0-55        

 

                    Serum or plasma protein measurement (mass/volume)           

7.8 g/dL            

6.4-8.2        

 

                    Serum or plasma albumin measurement (mass/volume)           

4.1 g/dL            

3.2-4.5        

 

                    CALCIUM CORRECTED           8.6 mg/dL           8.5-10.1    

    

 

                                        Fibrin D-dimer FEU measurement in platel

et poor plasma (mass/volume) - 20 

11:40         

 

                          Fibrin D-dimer FEU measurement in platelet poor plasma

 (mass/volume)           <

 ug/mL                                  0.00-0.49        

 

                                        Blood lactic acid measurement (moles/vol

ume) - 20 11:40         

 

                    Blood lactic acid measurement (moles/volume)           1.34 

mmol/L           

0.50-2.00        

 

                                        Serum ragweed IgE antibody assay -  11:40         

 

                    Serum ragweed IgE antibody assay           304 U/L          

   125-220        

 

                                        PROCALCITONIN (PCT) - 20 11:40    

     

 

                    PROCALCITONIN (PCT)           0.06 ng/mL           <0.10    

    

 

                                        Erythrocyte sedimentation rate by willy

gren method - 20 11:40         

 

                    Erythrocyte sedimentation rate by westergren method         

  88 mm               0-

20        

 

                                        Serum or plasma C reactive protein measu

rement (mass/volume) - 20 11:40   

      

 

                          Serum or plasma C reactive protein measurement (mass/v

olume)           11.20 

mg/dL                                   0.00-0.50        

 

                                        Complete urinalysis with reflex to cultu

re - 20 11:50         

 

                    Urine color determination           YELLOW              NRG 

       

 

                    Urine clarity determination           CLEAR               NR

G        

 

                    Urine pH measurement by test strip           6.0            

     5-9        

 

                    Specific gravity of urine by test strip           1.020     

          1.016-1.022  

      

 

                    Urine protein assay by test strip, semi-quantitative        

   2+                  

NEGATIVE        

 

                    Urine glucose detection by automated test strip           NE

GATIVE            

NEGATIVE        

 

                          Erythrocytes detection in urine sediment by light micr

oscopy           NEGATIVE 

                                        NEGATIVE        

 

                    Urine ketones detection by automated test strip           NE

GATIVE            

NEGATIVE        

 

                    Urine nitrite detection by test strip           NEGATIVE    

        NEGATIVE    

    

 

                    Urine total bilirubin detection by test strip           NEGA

TIVE            

NEGATIVE        

 

                          Urine urobilinogen measurement by automated test strip

 (mass/volume)           

2.0 mg/dL                               < = 1.0        

 

                    Urine leukocyte esterase detection by dipstick           NEG

ATIVE            

NEGATIVE        

 

                                        Automated urine sediment erythrocyte cou

nt by microscopy (number/high power 

field)                    NONE                      NRG        

 

                                        Automated urine sediment leukocyte count

 by microscopy (number/high power field)

                           [HPF]                    NRG        

 

                          Bacteria detection in urine sediment by light microsco

py           NEGATIVE     

                                        NRG        

 

                                        Squamous epithelial cells detection in u

rine sediment by light microscopy       

                          5-10                      NRG        

 

                          Crystals detection in urine sediment by light microsco

py           NONE         

                                        NRG        

 

                    Casts detection in urine sediment by light microscopy       

    NONE                

NRG        

 

                          Mucus detection in urine sediment by light microscopy 

          NEGATIVE        

                                        NRG        

 

                    Complete urinalysis with reflex to culture           NO     

             NRG        



                                                                              



Encounters

      



                ACCT No.           Visit Date/Time           Discharge          

 Status         

             Pt. Type           Provider           Facility           Loc./Unit 

          

Complaint        

 

                [id number]           2020 17:40:00           2020 23:59:5

9           CLS 

                Outpatient           HUNTER KO                        

   [facility]                                   

 

             [id number]           2020 08:20:00                              

       Document

 Registration                                                                   

 

 

             [id number]           2019 08:20:00                              

       Document

 Registration                                                                   

 

 

             [id number]           2019 08:40:00                              

       Document

 Registration                                                                   

 

 

             [id number]           2019 11:00:00                              

       Document

 Registration                                                                   

 

 

             [id number]           2018 09:00:00                              

       Document

 Registration                                                                   

 

 

             [id number]           2018 15:40:00                              

       Document

 Registration                                                                   

 

 

             [id number]           2018 12:20:00                              

       Document

 Registration                                                                   

 

 

             [id number]           2018 08:20:00                              

       Document

 Registration                                                                   

 

 

             [id number]           12/15/2017 09:00:00                              

       Document

 Registration                                                                   

 

 

                    [id number]           2018 17:45:00           

018 15:58:00        

                DIS             Outpatient           RANDALL NAVA MD           

[facility]           SDC                       RULE OUT APPENDICITIS  

      

 

                    [id number]           2018 09:53:00           

018 23:59:59        

                CLS             Outpatient           ZOIE JI       

    [facility]           RAD                       RLQ ABD PAIN        

 

                    [id number]           2018 15:44:00           

018 17:40:00        

                DIS             Emergency           ITALO BECKMAN MD          

 [facility]           ER                        LOSS OF VISION, COLD, S

HAKING - PRE 

DIABETIC        

 

                    [id number]           2014 15:22:00           

014 23:59:59        

                CLS             Outpatient           DEANA ALMEIDA MD          

 [facility]           RAD                       PELVIC PAIN LEFT OVARIA

N CYST      

  

 

                    [id number]           2014 12:00:00           

014 15:30:00        

                DIS             Outpatient           DEANA ALMEIDA MD          

 [facility]           SDC                       KIDNEY STONE        

 

                    [id number]           2014 09:11:00           

014 23:59:59        

                CLS             Outpatient           DEANA ALMEIDA MD          

 [facility]           RAD                       KIDNEY STONE,FLANK PAIN

        

 

                    [id number]           2013 17:06:00            23:59:59        

           CLS           Outpatient                                             

        

   

 

                    [id number]           2013 18:20:00           

013 21:29:00        

                DIS             Emergency           SHELLI PHIPPS       

    [facility]           ER                        R SIDE ABDOMINAL PAIN 

 KIDNEY 

STONES        

 

                    [id number]           2013 12:36:00           

013 13:30:00        

                DIS             Inpatient           DEANA ALMEIDA MD           

[facility]           WS                        INDUCTION        

 

                    [id number]           2013 12:52:00            23:59:59        

                CLS             Outpatient           DEANA ALMEIDA MD          

 [facility]           RAD                       FETAL POSITION,FETAL HE

ARTRATE     

   

 

             [id number]           2020 12:10:00                         

            

Document Registration                                                           

         

 

             [id number]           2013 10:08:00                         

            

Document Registration                                                           

         

 

             [id number]           2012 11:32:00                         

            

Document Registration

## 2020-07-09 NOTE — XMS REPORT
[facility]

                             Created on: 2018



Saida Fermin

External Reference #: [id number]

: 1985

Sex: Female



Demographics





                          Address                   [address] [place]  [address]

 

                          Preferred Language        Unknown

 

                          Marital Status            Unknown

 

                          [employer] Affiliation     Unknown

 

                          Race                      Unknown

 

                          Ethnic Group              Unknown





Author





                          Author                    Saida RAUSCH

 

                          Organization              [facility]

 

                          Address                   [address] [place]  [address]



 

                          Phone                     [phone]







Care Team Providers





                    Care Team Member Name Role                Phone

 

                    SHALOMCHETELE     Unavailable         [phone]







PROBLEMS





          Type      Condition ICD9-CM Code YZP26-AW Code Onset Dates Condition S

tatus SNOMED 

Code

 

          Problem   Obesity (BMI 30.0-34.9)           E66.9               Active

    [id number]

 

          Problem   Abnormal CBC measurement           R79.89              Activ

e    [id number]

 

          Problem   Other obesity due to excess calories           E66.09       

       Active    [id number]

 

          Problem   Primary insomnia           F51.01              Active    397

2004

 

                Problem         Iron deficiency anemia secondary to inadequate d

ietary iron intake                 

D50.8                                   Active              [id number]

 

                Problem         Migraine without aura and without status migrain

osus, not intractable                 

G43.009                                 Active              [id number]

 

          Problem   Chronic fatigue           R53.82              Active    8422

9001

 

          Problem   Body mass index (BMI) of 34.0-34.9 in adult           Z68.34

              Active    [id number]







ALLERGIES

No Information



ENCOUNTERS





                Encounter       Location        Date            Diagnosis

 

                          [facility]     [address] [id number]

[address] [place] [address]

                          31 May, 2018              Iron deficiency anemia secon

adriano to inadequate dietary iron intake 

D50.8 ; Migraine without aura and without status migrainosus, not intractable 
G43.009 ; Other obesity due to excess calories E66.09 ; Body mass index (BMI) of
34.0-34.9 in adult Z68.34 ; Elevated fasting glucose R73.01 ; Primary insomnia 
F51.01 and Chronic fatigue R53.82

 

                          [facility]     [address] [facility] [id number]

[address] [place] [address]

                          22 May, 2018              Encounter for Depo-Provera c

ontraception Z30.42

 

                          [facility]     [address] [facility] [id number]

[address] [place] [address]

                                        Encounter for well woman exa

m Z01.419 ; Encounter for Depo-Provera 

contraception Z30.42 ; Obesity (BMI 30.0-34.9) E66.9 and Hyperinsulinemia E16.1

 

                          [place] [address] [address]

                                        Hyperinsulinemia E16.1

 

                          [place] [address] [address]

                                        Hyperinsulinemia E16.1

 

                          [place] [address] [address]

                                        Hyperinsulinemia E16.1 ; Zackery

hayes without aura and without status 

migrainosus, not intractable G43.009 ; Obesity (BMI 30.0-34.9) E66.9 and Iron 
deficiency anemia secondary to inadequate dietary iron intake D50.8

 

                          [place] [address] [address]

                          15 Dec, 2017              Hyperinsulinemia E16.1 ; Zackery

hayes without aura and without status 

migrainosus, not intractable G43.009 ; Obesity (BMI 30.0-34.9) E66.9 ; Iron 
deficiency anemia secondary to inadequate dietary iron intake D50.8 ; Oral 
contraceptive pill surveillance Z30.41 and Dysuria R30.0

 

                          [facility]     [address] [address]

                          15 Dec, 2017              Abnormal CBC R79.89

 

                          [facility]  [address] 

[phone]                17 2017              Tachycardia R00.0

 

                          [facility]     [address] [address]

                          17 2017               

 

                          [place] [address] [address]

                          14 Sep, 2017              Hyperinsulinemia E16.1 ; Obe

sity (BMI 30.0-34.9) E66.9 and Migraine

without aura and without status migrainosus, not intractable G43.009

 

                          [place] [address] [address] [place] [address]

                          15 Aug, 2017               

 

                          [facility]  [address] [id number]

[address] [place] [address] 

[address]                10 Aug, 2017              Acute non-recurrent maxillar

y sinusitis J01.00

 

                          [facility]   [address] [facility] [id number]

[address] [place] [id number]

                                        Dental examination Z01.20

 

                          [facility]   [id number] [place] [id number]

                          31 May, 2017              Dental examination Z01.20

 

                          [facility]     [address] [id number]

[address] [place] [address]

                          03 2017              Encounter for well woman exa

m with routine gynecological exam 

Z01.419 ; Encounter for screening for malignant neoplasm of cervix Z12.4 ; 
Encounter for screening breast examination Z12.39 ; Screen for STD (sexually 
transmitted disease) Z11.3 and Encounter for prescription of oral contraceptives
Z30.011

 

                          [facility]   [address] [facility] [id number]

[address] [place] [id number]

                                        Dental examination Z01.20

 

                          [facility]     [id number]

[address] [place] [address]

                          19 Dec, 2016              Abnormal CBC R79.89

 

                          [facility]     [address] [id number]

[address] [place] [address]

                          12 Dec, 2016              Routine health maintenance Z

00.00 ; History of renal calculi 

Z87.442 ; Obesity (BMI 30.0-34.9) E66.9 and Family history of diabetes mellitus 
Z83.3

 

                          [facility]     [address] [id number]

[address] [place] [address]

                          08 Dec, 2016              Routine health maintenance Z

00.00 ; History of renal calculi 

Z87.442 ; Obesity (BMI 30.0-34.9) E66.9 and Family history of diabetes mellitus 
Z83.3

 

                          [facility]  [address] [id number]

[address] [place] [address] 

[address]                28 Sep, 2016              Irritable bowel syndrome wit

h diarrhea K58.0

 

                          [facility]   [address] [id number]

[address] [id number]

                          24 Aug, 2016              Dental examination Z01.20

 

                          [facility]   [address] [id number]

[address] [place] [phone]

                                        Dental caries K02.9

 

                          [facility]   [address] [id number]

[address] [place] [phone]

                          24 May, 2016              Dental examination Z01.20







IMMUNIZATIONS

No Known Immunizations



SOCIAL HISTORY

Never Assessed



REASON FOR VISIT

Lab (walk-in)--[facility]



PLAN OF CARE





VITAL SIGNS





MEDICATIONS

No Known Medications



RESULTS





                Name            Result          Date            Reference Range

 

                INSULIN LEVEL                   2018       

 

                INSULIN         7.9                             2.0-19.6







PROCEDURES





                Procedure       Date Ordered    Result          Body Site

 

                ASSAY OF INSULIN 2018                     

 

                VENIPUNCT, ROUTINE* 2018                     







INSTRUCTIONS





MEDICATIONS ADMINISTERED

No Known Medications



MEDICAL (GENERAL) HISTORY





                    Type                Description         Date

 

                    Medical History     ERCP                 

 

                    Medical History     Renal calculi        

 

                    Medical History     Migraines            

 

                    Surgical History    kidney stone removed  

 

                    Hospitalization History kidney stones        

 

                          Hospitalization History   ER visit- itching all over b

madelin, blurry vision, SOB dxd 

migraine                                2018

## 2020-07-09 NOTE — XMS REPORT
[facility]

                             Created on: 2017



Saida Fermin

External Reference #: [id number]

: 1985

Sex: Female



Demographics





                          Address                   [address] [place]  [address]

 

                          Preferred Language        Unknown

 

                          Marital Status            Unknown

 

                          Judaism Affiliation     Unknown

 

                          Race                      Unknown

 

                          Ethnic Group              Unknown





Author





                          Author                    Saida RAUSCH

 

                          Organization              [facility]

 

                          Address                   [address] [place]  [address]



 

                          Phone                     [phone]







Care Team Providers





                    Care Team Member Name Role                Phone

 

                    SHALOM  CAMERON     Unavailable         [phone]







PROBLEMS





          Type      Condition ICD9-CM Code CQW24-DK Code Onset Dates Condition S

tatus SNOMED 

Code

 

                Problem         Migraine without aura and without status migrain

osus, not intractable                 

G43.009                                 Active              [id number]

 

          Problem   Obesity (BMI 30.0-34.9)           E66.9               Active

    [id number]

 

          Problem   Hyperinsulinemia           E16.1               Active    834

81482

 

          Problem   Abnormal CBC measurement           R79.89              Activ

e    [id number]







ALLERGIES

No Known Allergies



SOCIAL HISTORY

Never Assessed



PLAN OF CARE





                          Activity                  Details

 

                                         

 

                          Follow Up                 prn, 3 Months Reason:CHM







VITAL SIGNS





                    Height              61 in               2017

 

                    Weight              173.1 lbs           2017

 

                    Temperature         98.0 degrees Fahrenheit 2017

 

                    Heart Rate          78 bpm              2017

 

                    Respiratory Rate    18                  2017

 

                    BMI                 32.70 kg/m2         2017

 

                    Blood pressure systolic 120 mmHg            2017

 

                    Blood pressure diastolic 72 mmHg             2017







MEDICATIONS





        Medication Instructions Dosage  Frequency Start Date End Date Duration S

tatus

 

          Mononessa 0.25-35 MG-MCG Orally Once a day 1 tablet  24h                  28 day(s)

                                        Active

 

        Advil 200 MG Orally every 6 hrs 1 tablet as needed 6h                   

           Active

 

                    Metformin HCl 500 MG Orally Daily with evening meal x1 week,

 then Twice a day 1 

tablet with meals              19 Dec, 2016              30 day(s)    Active







RESULTS





                Name            Result          Date            Reference Range

 

                PREGNANCY TEST, URINE (IN HOUSE)                 2017     

  

 

                RESULTS         neg                              

 

                Lot #           [id number]                          

 

                Control         +                                

 

                Exp date        2018                          

 

                TRICHOMONAS (IN HOUSE)                 2017       

 

                TRICHOMONAS     neg                              

 

                Control         +                                

 

                Lot #           [id number]                           

 

                Exp date        2018                        

 

                BACTERIAL VAGINOSIS (IN HOUSE)                 2017       

 

                RESULTS         neg                              

 

                Control         +                                

 

                Lot #                                       

 

                Exp date        2017                          

 

                CULTURE, GENITAL                 2017       

 

                Genital Culture, Routine Final report                     

 

                Result 1                                         

 

                PAP TEST W/ HPV REGARDLESS                 2017       

 

                DIAGNOSIS:                                       

 

                Specimen adequacy:                                  

 

                Clinician provided ICD10:                                  

 

                Performed by:                                    

 

                .               .                                

 

                Note:                                            

 

                HPV, high-risk  Negative                        Negative

 

                PDF Report                      2017       

 

                PDF Report1     [facility]                             

 

                GC/CHLAM PROBE (STATE)                 2017       

 

                CHLAMYDIA       negative                         

 

                GC              negative                         







PROCEDURES





                Procedure       Date Ordered    Result          Body Site

 

                PELVIC EXAMINATION 2017      N/A              

 

                URINE PREGNANCY TEST 2017                     

 

                CULTURE, BACTERIA, OTHER 2017                     

 

                TRICHOMONAS ASSAY W/OPTIC 2017                     

 

                SPECIMEN HANDLING 2017                     

 

                No Charge       2017                     

 

                DEL CID VAG, DNA, DIR PROBE 2017                     







IMMUNIZATIONS

No Known Immunizations



MEDICAL (GENERAL) HISTORY





                    Type                Description         Date

 

                    Medical History     ERCP                 

 

                    Medical History     Renal calculi        

 

                    Hospitalization History kidney stones

## 2020-07-09 NOTE — XMS REPORT
Continuity of Care Document

                             Created on: 2020



Zander Garibay, Saida

External Reference #: [id number]

: 1985

Sex: Female



Demographics





                          Address                   [address] [place]  [address]

 

                          Home Phone                [phone] x

 

                          Preferred Language        Unknown

 

                          Marital Status            Unknown

 

                          Druze Affiliation     Unknown

 

                          Race                      Unknown

 

                          Ethnic Group              Unknown





Author





                          Organization              Unknown

 

                          Address                   Unknown

 

                          Phone                     Unavailable



              



Allergies

      



             Active           Description           Code           Type         

  Severity   

                Reaction           Onset           Reported/Identified          

 

Relationship to Patient                 Clinical Status        

 

                Yes             NKANo Known Allergies           NKA             

Miscellaneous 

Allergy           Unknown           N/A                             2006  

      

                                                             



                  



Medications

      



There is no data.                  



Problems

      



             Date Dx Coded           Attending           Type           Code    

       

Diagnosis                               Diagnosed By        

 

             2013           DEANA ALMEIDA MD           Ot           650  

         

NORMAL DELIVERY                                  

 

             2013           DEANA ALMEIDA MD           Ot           V27.0

           

DELIVER-SINGLE LIVEBORN                          

 

                2013           SHELLI PHIPPS           Ot         

     [phone]         

                          CALCULUS OF KIDNEY                    

 

                2013           SHELLI PHIPPS           Ot         

     [phone]        

                          ABDOMINAL PAIN, OTHER SPECIFIED SITE                  

  

 

             2014           DEANA ALMEIDA MD           Ot           592.0

           

CALCULUS OF KIDNEY                               

 

             2014           DEANA ALMEIDA MD           Ot           592.1

           

CALCULUS OF URETER                               

 

             2018           ITALO BECKMAN MD           Ot           D64.

9           

ANEMIA, UNSPECIFIED                              

 

                2018           ITALO BECKMAN MD           Ot            

  H53.123          

                          TRANSIENT VISUAL LOSS, BILATERAL                    

 

             2018           ITALO BECKMAN MD           Ot           R51 

          

HEADACHE                                         

 

             2018           ITALO BECKMAN MD           Ot           Z79.

84           

LONG TERM (CURRENT) USE OF ORAL HYPOGLYC                    

 

                2018           ITALO BECKMAN MD           Ot            

  Z87.442          

                          PERSONAL HISTORY OF URINARY CALCULI                   

 

 

                2018           ITALO BECKMAN MD           Ot            

  Z87.448          

                          PERSONAL HISTORY OF OTHER DISEASES OF UR              

      

 

             2018           ITALO BECKMAN MD           Ot           Z88.

1           

ALLERGY STATUS TO OTHER ANTIBIOTIC AGENT                    

 

             2018                        Ot           285.9           [facility]

IA NOS        

                                                 

 

             2018                        Ot           648.23           [facility]

FLOWER-ANTEPARTUM

                                                 

 

             2018                        Ot           649.63           [employer] [place] [place]

RINE SIZE DATE

DISCREPANCY, ANTEPARTU                           

 

             2018           DEANA ALMEIDA MD           Ot           649.6

3           

UTERINE SIZE DATE DISCREPANCY, ANTEPARTU                    

 

             2018           DEANA ALMEIDA MD           Ot           V28.8

9           

OTHER SPECIFIED  SCREENING                    

 

             2018           DEANA ALMEIDA MD           Ot           592.0

           

CALCULUS OF KIDNEY                               

 

             2018           DEANA ALMEIDA MD           Ot           789.0

9           

ABDOMINAL PAIN, OTHER SPECIFIED SITE                    

 

             2018           DEANA ALMEIDA MD           Ot           620.2

           

OVARIAN CYST NEC/NOS                             

 

             2018           DEANA ALMEIDA MD           Ot           625.9

           

FEM GENITAL SYMPTOMS NOS                         

 

             2018                        Ot           285.9           [facility]

IA NOS        

                                                 

 

             2018                        Ot           648.23           [facility]

FLOWER-ANTEPARTUM

                                                 

 

             2018                        Ot           649.63           [employer] [place]

RINE SIZE DATE

DISCREPANCY, ANTEPARTU                           

 

             2018           DEANA ALMEIDA MD           Ot           649.6

3           

UTERINE SIZE DATE DISCREPANCY, ANTEPARTU                    

 

             2018           DEANA ALMEIDA MD           Ot           V28.8

9           

OTHER SPECIFIED  SCREENING                    

 

             2018           DEANA ALMEIDA MD           Ot           592.0

           

CALCULUS OF KIDNEY                               

 

             2018           DEANA ALMEIDA MD           Ot           789.0

9           

ABDOMINAL PAIN, OTHER SPECIFIED SITE                    

 

             2018           DEANA ALMEIDA MD           Ot           620.2

           

OVARIAN CYST NEC/NOS                             

 

             2018           DEANA ALMEIDA MD           Ot           625.9

           

FEM GENITAL SYMPTOMS NOS                         

 

             2018           ITALO BECKMAN MD           Ot           D64.

9           

ANEMIA, UNSPECIFIED                              

 

                2018           KARUNA VUONG, ITALO JESUS           Ot            

  H53.123          

                          TRANSIENT VISUAL LOSS, BILATERAL                    

 

             2018           ITALO BECKMAN MD           Ot           R51 

          

HEADACHE                                         

 

             2018           ITALO BECKMAN MD           Ot           Z79.

84           

LONG TERM (CURRENT) USE OF ORAL HYPOGLYC                    

 

                2018           ITALO BECKMAN MD           Ot            

  Z87.442          

                          PERSONAL HISTORY OF URINARY CALCULI                   

 

 

                2018           ITALO BECKMAN MD           Ot            

  Z87.448          

                          PERSONAL HISTORY OF OTHER DISEASES OF UR              

      

 

             2018           ITALO BECKMAN MD           Ot           Z88.

1           

ALLERGY STATUS TO OTHER ANTIBIOTIC AGENT                    

 

             2018                        Ot           649.63           [employer] [place]

RINE SIZE DATE

DISCREPANCY, ANTEPARTU                           

 

             2018           DEANA ALMEIDA MD           Ot           649.6

3           

UTERINE SIZE DATE DISCREPANCY, ANTEPARTU                    

 

             2018           DEANA ALMEIDA MD           Ot           V28.8

9           

OTHER SPECIFIED  SCREENING                    

 

             2018           DEANA ALMEIDA MD           Ot           592.0

           

CALCULUS OF KIDNEY                               

 

             2018           DEANA ALMEIDA MD           Ot           789.0

9           

ABDOMINAL PAIN, OTHER SPECIFIED SITE                    

 

             2018           ELLE VUONG, DEANA JESUS           Ot           620.2

           

OVARIAN CYST NEC/NOS                             

 

             2018           ELLE VUONG, DEANA JESUS           Ot           625.9

           

FEM GENITAL SYMPTOMS NOS                         

 

             2018                        Ot           649.63           [employer] [place]

RINE SIZE DATE

DISCREPANCY, ANTEPARTU                           

 

             2018           DEANA ALMEIDA MD           Ot           649.6

3           

UTERINE SIZE DATE DISCREPANCY, ANTEPARTU                    

 

             2018           DEANA ALMEIDA MD           Ot           V28.8

9           

OTHER SPECIFIED  SCREENING                    

 

             2018           ELLE VUONG, DEANA JESUS           Ot           592.0

           

CALCULUS OF KIDNEY                               

 

             2018           DEANA ALMEIDA MD           Ot           789.0

9           

ABDOMINAL PAIN, OTHER SPECIFIED SITE                    

 

             2018           DEANA ALMEIDA MD           Ot           620.2

           

OVARIAN CYST NEC/NOS                             

 

             2018           ELLE VUONG, DEANA JESUS           Ot           625.9

           

FEM GENITAL SYMPTOMS NOS                         

 

                2018           ZOIE JI           Ot          

    K38.1          

                          APPENDICULAR CONCRETIONS                    

 

             2018           RANDALL NAVA MD           Ot           E11.9 

          

TYPE 2 DIABETES MELLITUS WITHOUT COMPLIC                    

 

             2018           RANDALL NAVA MD           Ot           K35.80

           

UNSPECIFIED ACUTE APPENDICITIS                    

 

             2018           RANDALL NAVA MD           Ot           Z79.84

           

LONG TERM (CURRENT) USE OF ORAL HYPOGLYC                    

 

                2018           ZOIE JI           Ot          

    K38.1          

                          APPENDICULAR CONCRETIONS                    

 

             2018           RADNALL NAVA MD           Ot           E11.9 

          

TYPE 2 DIABETES MELLITUS WITHOUT COMPLIC                    

 

             2018           RANDALL NAVA MD           Ot           K35.80

           

UNSPECIFIED ACUTE APPENDICITIS                    

 

             2018           RANDALL NAVA MD           Ot           Z79.84

           

LONG TERM (CURRENT) USE OF ORAL HYPOGLYC                    

 

             2018           RANDALL NAVA MD           Ot           E11.9 

          

TYPE 2 DIABETES MELLITUS WITHOUT COMPLIC                    

 

             2018           RANDALL NAVA MD           Ot           K35.80

           

UNSPECIFIED ACUTE APPENDICITIS                    

 

             2018           RANDALL NAVA MD           Ot           Z79.84

           

LONG TERM (CURRENT) USE OF ORAL HYPOGLYC                    

 

                2018           ZOIE JI           Ot          

    K38.1          

                          APPENDICULAR CONCRETIONS                    



                                                                                
                                                               



Procedures

      



                Code            Description           Performed By           Per

formed On        

 

                                      73.4                                 MEDIC

AL INDUCTION LABOR        

                                                    2013        

 

                                      73.59                                 MANU

AL ASSIST DELIV NEC       

                                                    2013        

 

                                      [id number]                                 RE

SECTION OF APPENDIX, 

PERCUTANEOUS ENDO                                               2018      

  



                      



Results

      



                    Test                Result              Range        

 

                                        ANEMIA PANEL - 12/15/17 09:14         

 

                    IRON, TOTAL           72 mcg/dL                   

 

                    FERRITIN            7 ng/mL                     

 

                    IRON BINDING CAPACITY           443 mcg/dL (calc)           

250-450        

 

                    % SATURATION           16 % (calc)           11-50        

 

                                        CBC - 12/15/17 09:14         

 

                    MESSAGE:                                NRG        

 

                    CONTAINER TYPE:           LAVENDER            NRG        

 

                                        THYROID ANALYZER - 18 08:12       

  

 

                    TSH                 1.55 mIU/L           NRG        

 

                                        INSULIN LEVEL - 18 12:34         

 

                    INSULIN             7.9 uIU/mL            2.0-19.6        

 

                                        Capillary blood glucose measurement by g

lucometer (mass/volume) - 18 15:59

        

 

                          Capillary blood glucose measurement by glucometer (mas

s/volume)           107 

mg/dL                                           

 

                                        Urine drug screening test - 18 16:

30         

 

                    Urine phencyclidine detection by screening method           

NEGATIVE            

NEGATIVE        

 

                          Urine benzodiazepines detection by screening method   

        NEGATIVE          

                                        NEGATIVE        

 

                    Urine cocaine detection           NEGATIVE            NEGATI

VE        

 

                    Urine amphetamines detection by screening method           N

EGATIVE            

NEGATIVE        

 

                          Urine methamphetamine detection by screening method   

        NEGATIVE          

                                        NEGATIVE        

 

                    Urine cannabinoids detection by screening method           N

EGATIVE            

NEGATIVE        

 

                    Urine opiates detection by screening method           NEGATI

VE            

NEGATIVE        

 

                    Urine barbiturates detection           NEGATIVE            N

EGATIVE        

 

                          Screening urine tricyclic antidepressants detection   

        NEGATIVE          

                                        NEGATIVE        

 

                    Urine methadone detection by screening method           NEGA

TIVE            

NEGATIVE        

 

                    Urine oxycodone detection           NEGATIVE            NEGA

TIVE        

 

                    Urine propoxyphene detection           NEGATIVE            N

EGATIVE        

 

                                        Complete urinalysis with reflex to cultu

re - 18 16:30         

 

                    Urine color determination           YELLOW              NRG 

       

 

                    Urine clarity determination           VERY CLOUDY           

 NRG        

 

                    Urine pH measurement by test strip           6.5            

     5-9        

 

                    Specific gravity of urine by test strip           1.020     

          1.016-1.022  

     

 

                    Urine protein assay by test strip, semi-quantitative        

   3+                  

NEGATIVE        

 

                    Urine glucose detection by automated test strip           NE

GATIVE            

NEGATIVE        

 

                          Erythrocytes detection in urine sediment by light micr

oscopy           5+       

                                        NEGATIVE        

 

                    Urine ketones detection by automated test strip           NE

GATIVE            

NEGATIVE        

 

                    Urine nitrite detection by test strip           NEGATIVE    

        NEGATIVE    

   

 

                    Urine total bilirubin detection by test strip           NEGA

TIVE            

NEGATIVE        

 

                          Urine urobilinogen measurement by automated test strip

 (mass/volume)           1

mg/dL                                   NORMAL        

 

                    Urine leukocyte esterase detection by dipstick           1+ 

                 NEGATIVE 

      

 

                                        Automated urine sediment erythrocyte cou

nt by microscopy (number/high power 

field)                    > [HPF]                   NRG        

 

                                        Automated urine sediment leukocyte count

 by microscopy (number/high power field)

                           [HPF]                    NRG        

 

                          Bacteria detection in urine sediment by light microsco

py           NEGATIVE     

                                        NRG        

 

                                        Squamous epithelial cells detection in u

rine sediment by light microscopy       

                          0-2                       NRG        

 

                          Crystals detection in urine sediment by light microsco

py           NONE         

                                        NRG        

 

                    Casts detection in urine sediment by light microscopy       

    NONE                

NRG        

 

                          Mucus detection in urine sediment by light microscopy 

          SMALL           

                                        NRG        

 

                    Complete urinalysis with reflex to culture           NO     

             NRG        

 

                                        Complete blood count (CBC) with automate

d white blood cell (WBC) differential - 

18 16:41         

 

                          Blood leukocytes automated count (number/volume)      

     8.9 10*3/uL          

                                        4.3-11.0        

 

                          Blood erythrocytes automated count (number/volume)    

       4.09 10*6/uL       

                                        4.35-5.85        

 

                    Venous blood hemoglobin measurement (mass/volume)           

11.1 g/dL           

11.5-16.0        

 

                    Blood hematocrit (volume fraction)           34 %           

     35-52        

 

                    Automated erythrocyte mean corpuscular volume           82 [

foz_us]           

80-99        

 

                                        Automated erythrocyte mean corpuscular h

emoglobin (mass per erythrocyte)        

                          27 pg                     25-34        

 

                                        Automated erythrocyte mean corpuscular h

emoglobin concentration measurement 

(mass/volume)             33 g/dL                   32-36        

 

                    Automated erythrocyte distribution width ratio           14.

2 %              10.0-

14.5        

 

                    Automated blood platelet count (count/volume)           259 

10*3/uL           

130-400        

 

                          Automated blood platelet mean volume measurement      

     11.1 [foz_us]        

                                        7.4-10.4        

 

                    Automated blood neutrophils/100 leukocytes           82 %   

             42-75       

 

 

                    Automated blood lymphocytes/100 leukocytes           15 %   

             12-44       

 

 

                    Blood monocytes/100 leukocytes           2 %                

 0-12        

 

                    Automated blood eosinophils/100 leukocytes           1 %    

             0-10        

 

                    Automated blood basophils/100 leukocytes           0 %      

           0-10        

 

                    Blood neutrophils automated count (number/volume)           

7.2 10*3            

1.8-7.8        

 

                    Blood lymphocytes automated count (number/volume)           

1.3 10*3            

1.0-4.0        

 

                    Blood monocytes automated count (number/volume)           0.

2 10*3            

0.0-1.0        

 

                    Automated eosinophil count           0.1 10*3/uL           0

.0-0.3        

 

                    Automated blood basophil count (count/volume)           0.0 

10*3/uL           

0.0-0.1        

 

                                        Comprehensive metabolic panel - 18

 16:41         

 

                          Serum or plasma sodium measurement (moles/volume)     

      140 mmol/L          

                                        135-145        

 

                          Serum or plasma potassium measurement (moles/volume)  

         3.8 mmol/L       

                                        3.6-5.0        

 

                          Serum or plasma chloride measurement (moles/volume)   

        108 mmol/L        

                                                

 

                    Carbon dioxide           18 mmol/L           21-32        

 

                          Serum or plasma anion gap determination (moles/volume)

           14 mmol/L      

                                        5-14        

 

                          Serum or plasma urea nitrogen measurement (mass/volume

)           14 mg/dL      

                                        7-18        

 

                          Serum or plasma creatinine measurement (mass/volume)  

         0.69 mg/dL       

                                        0.60-1.30        

 

                    Serum or plasma urea nitrogen/creatinine mass ratio         

  20                  NRG 

       

 

                                        Serum or plasma creatinine measurement w

ith calculation of estimated glomerular 

filtration rate           >                         NRG        

 

                    Serum or plasma glucose measurement (mass/volume)           

113 mg/dL           

        

 

                    Serum or plasma calcium measurement (mass/volume)           

8.4 mg/dL           

8.5-10.1        

 

                          Serum or plasma total bilirubin measurement (mass/volu

me)           0.2 mg/dL   

                                        0.1-1.0        

 

                                        Serum or plasma alkaline phosphatase gene

surement (enzymatic activity/volume)    

                          69 U/L                            

 

                                        Serum or plasma aspartate aminotransfera

se measurement (enzymatic 

activity/volume)           18 U/L                    5-34        

 

                                        Serum or plasma alanine aminotransferase

 measurement (enzymatic activity/volume)

                          14 U/L                    0-55        

 

                    Serum or plasma protein measurement (mass/volume)           

7.2 g/dL            

6.4-8.2        

 

                    Serum or plasma albumin measurement (mass/volume)           

3.6 g/dL            

3.2-4.5        

 

                                        Serum or plasma C reactive protein measu

rement (mass/volume) - 18 16:41   

      

 

                          Serum or plasma C reactive protein measurement (mass/v

olume)           0.96 

mg/dL                                   0.00-0.50        

 

                                        Erythrocyte sedimentation rate by willy

gren method - 18 16:41         

 

                    Erythrocyte sedimentation rate by westergren method         

  36 mm               0-

20        

 

                                        Serum or plasma ethanol measurement (mas

s/volume) - 18 16:41         

 

                    Serum or plasma ethanol measurement (mass/volume)           

< mg/dL             

<10        

 

                                        CULTURE, GENITAL - 18 16:36       

  

 

                    CULTURE, GENITAL           SEE NOTE            NRG        

 

                                        Complete blood count (CBC) with automate

d white blood cell (WBC) differential - 

18 18:15         

 

                          Blood leukocytes automated count (number/volume)      

     12.2 10*3/uL         

                                        4.3-11.0        

 

                          Blood erythrocytes automated count (number/volume)    

       4.15 10*6/uL       

                                        4.35-5.85        

 

                    Venous blood hemoglobin measurement (mass/volume)           

11.1 g/dL           

11.5-16.0        

 

                    Blood hematocrit (volume fraction)           34 %           

     35-52        

 

                    Automated erythrocyte mean corpuscular volume           81 [

foz_us]           

80-99        

 

                                        Automated erythrocyte mean corpuscular h

emoglobin (mass per erythrocyte)        

                          27 pg                     25-34        

 

                                        Automated erythrocyte mean corpuscular h

emoglobin concentration measurement 

(mass/volume)             33 g/dL                   32-36        

 

                    Automated erythrocyte distribution width ratio           14.

3 %              10.0-

14.5        

 

                    Automated blood platelet count (count/volume)           297 

10*3/uL           

130-400        

 

                          Automated blood platelet mean volume measurement      

     10.8 [foz_us]        

                                        7.4-10.4        

 

                    Automated blood neutrophils/100 leukocytes           76 %   

             42-75       

 

 

                    Automated blood lymphocytes/100 leukocytes           16 %   

             12-44       

 

 

                    Blood monocytes/100 leukocytes           6 %                

 0-12        

 

                    Automated blood eosinophils/100 leukocytes           2 %    

             0-10        

 

                    Automated blood basophils/100 leukocytes           0 %      

           0-10        

 

                    Blood neutrophils automated count (number/volume)           

9.2 10*3            

1.8-7.8        

 

                    Blood lymphocytes automated count (number/volume)           

2.0 10*3            

1.0-4.0        

 

                    Blood monocytes automated count (number/volume)           0.

7 10*3            

0.0-1.0        

 

                    Automated eosinophil count           0.2 10*3/uL           0

.0-0.3        

 

                    Automated blood basophil count (count/volume)           0.0 

10*3/uL           

0.0-0.1        

 

                                        Whole blood basic metabolic panel -  18:15         

 

                          Serum or plasma sodium measurement (moles/volume)     

      139 mmol/L          

                                        135-145        

 

                          Serum or plasma potassium measurement (moles/volume)  

         4.0 mmol/L       

                                        3.6-5.0        

 

                          Serum or plasma chloride measurement (moles/volume)   

        109 mmol/L        

                                                

 

                    Carbon dioxide           21 mmol/L           21-32        

 

                          Serum or plasma anion gap determination (moles/volume)

           9 mmol/L       

                                        5-14        

 

                          Serum or plasma urea nitrogen measurement (mass/volume

)           10 mg/dL      

                                        7-18        

 

                          Serum or plasma creatinine measurement (mass/volume)  

         0.65 mg/dL       

                                        0.60-1.30        

 

                    Serum or plasma urea nitrogen/creatinine mass ratio         

  15                  NRG 

       

 

                                        Serum or plasma creatinine measurement w

ith calculation of estimated glomerular 

filtration rate           >                         NRG        

 

                    Serum or plasma glucose measurement (mass/volume)           

106 mg/dL           

        

 

                    Serum or plasma calcium measurement (mass/volume)           

9.4 mg/dL           

8.5-10.1        

 

                                        Methicillin resistant Staphylococcus aur

eus (MRSA) screening culture - 18 

04:20         

 

                          Methicillin resistant Staphylococcus aureus (MRSA) scr

eening culture           

NEG                                     NRG        

 

                                        Hemoglobin A1c - 18 07:59         

 

                    Blood hemoglobin A1C measurement (mass/volume)           5.5

 %               4.0-5.6

        

 

                    MEAN BLOOD GLUCOSE           111 %               <=126      

  

 

                                        Urine beta human chorionic gonadotropin 

(hCG) measurement - 18 10:23      

   

 

                          Urine beta human chorionic gonadotropin (hCG) measurem

ent           NEGATIVE    

                                        NEGATIVE        

 

                                        VITAMIN D, 25-H - 18 10:25        

 

 

                    VITAMIN D,25-OH,TOTAL,IA           17 ng/mL            30-10

0        

 

                                        TSH - 19 11:39         

 

                    TSH                 0.89 mIU/L           NRG        

 

                                        CULTURE, URINE - 19 13:56         

 

                    CULTURE, URINE, ROUTINE           SEE NOTE            NRG   

     

 

                                        SUREPATH PAP AND HPV mRNA E6/E7 -  12:36         

 

                    CLINICAL INFORMATION:                               NRG     

   

 

                    LMP:                                    NRG        

 

                    PREV. PAP:                               NRG        

 

                    PREV. BX:                               NRG        

 

                    SOURCE:                                 NRG        

 

                    STATEMENT OF ADEQUACY:                               NRG    

    

 

                    INTERPRETATION/RESULT:                               NRG    

    

 

                    CYTOTECHNOLOGIST:                               NRG        

 

                    HPV mRNA E6/E7, SUREPATH VIAL           Not Detected        

    NOT DETECTED    

    

 

                    COMMENT                                 NRG        

 

                                        LIPID PANEL - 20 09:28         

 

                    CHOLESTEROL, TOTAL           188 mg/dL           <200       

 

 

                    HDL CHOLESTEROL           40 mg/dL            > OR = 50     

   

 

                    TRIGLYCERIDES           151 mg/dL           <150        

 

                    LDL-CHOLESTEROL           122 mg/dL (calc)           NRG    

    

 

                    CHOL/HDLC RATIO           4.7 (calc)           <5.0        

 

                    NON HDL CHOLESTEROL           148 mg/dL (calc)           <13

0        

 

                                        Complete blood count (CBC) with automate

d white blood cell (WBC) differential - 

20 11:40         

 

                          Blood leukocytes automated count (number/volume)      

     6.6 10*3/uL          

                                        4.3-11.0        

 

                          Blood erythrocytes automated count (number/volume)    

       4.34 10*6/uL       

                                        4.35-5.85        

 

                    Venous blood hemoglobin measurement (mass/volume)           

11.8 g/dL           

11.5-16.0        

 

                    Blood hematocrit (volume fraction)           35 %           

     35-52        

 

                    Automated erythrocyte mean corpuscular volume           80 [

foz_us]           

80-99        

 

                                        Automated erythrocyte mean corpuscular h

emoglobin (mass per erythrocyte)        

                          27 pg                     25-34        

 

                                        Automated erythrocyte mean corpuscular h

emoglobin concentration measurement 

(mass/volume)             34 g/dL                   32-36        

 

                    Automated erythrocyte distribution width ratio           14.

6 %              10.0-

14.5        

 

                    Automated blood platelet count (count/volume)           222 

10*3/uL           

130-400        

 

                          Automated blood platelet mean volume measurement      

     10.4 [foz_us]        

                                        7.4-10.4        

 

                    Automated blood neutrophils/100 leukocytes           84 %   

             42-75       

 

 

                    Automated blood lymphocytes/100 leukocytes           13 %   

             12-44       

 

 

                    Blood monocytes/100 leukocytes           3 %                

 0-12        

 

                    Automated blood eosinophils/100 leukocytes           0 %    

             0-10        

 

                    Automated blood basophils/100 leukocytes           0 %      

           0-10        

 

                    Blood neutrophils automated count (number/volume)           

5.6 10*3            

1.8-7.8        

 

                    Blood lymphocytes automated count (number/volume)           

0.9 10*3            

1.0-4.0        

 

                    Blood monocytes automated count (number/volume)           0.

2 10*3            

0.0-1.0        

 

                    Automated eosinophil count           0.0 10*3/uL           0

.0-0.3        

 

                    Automated blood basophil count (count/volume)           0.0 

10*3/uL           

0.0-0.1        

 

                                        Comprehensive metabolic panel - 20

 11:40         

 

                          Serum or plasma sodium measurement (moles/volume)     

      138 mmol/L          

                                        135-145        

 

                          Serum or plasma potassium measurement (moles/volume)  

         2.7 mmol/L       

                                        3.6-5.0        

 

                          Serum or plasma chloride measurement (moles/volume)   

        106 mmol/L        

                                                

 

                    Carbon dioxide           18 mmol/L           21-32        

 

                          Serum or plasma anion gap determination (moles/volume)

           14 mmol/L      

                                        5-14        

 

                          Serum or plasma urea nitrogen measurement (mass/volume

)           4 mg/dL       

                                        7-18        

 

                          Serum or plasma creatinine measurement (mass/volume)  

         0.77 mg/dL       

                                        0.60-1.30        

 

                    Serum or plasma urea nitrogen/creatinine mass ratio         

  5                   NRG  

      

 

                                        Serum or plasma creatinine measurement w

ith calculation of estimated glomerular 

filtration rate           >                         NRG        

 

                    Serum or plasma glucose measurement (mass/volume)           

207 mg/dL           

        

 

                    Serum or plasma calcium measurement (mass/volume)           

8.7 mg/dL           

8.5-10.1        

 

                          Serum or plasma total bilirubin measurement (mass/volu

me)           0.4 mg/dL   

                                        0.1-1.0        

 

                                        Serum or plasma alkaline phosphatase gene

surement (enzymatic activity/volume)    

                          93 U/L                            

 

                                        Serum or plasma aspartate aminotransfera

se measurement (enzymatic 

activity/volume)           29 U/L                    5-34        

 

                                        Serum or plasma alanine aminotransferase

 measurement (enzymatic activity/volume)

                          26 U/L                    0-55        

 

                    Serum or plasma protein measurement (mass/volume)           

7.8 g/dL            

6.4-8.2        

 

                    Serum or plasma albumin measurement (mass/volume)           

4.1 g/dL            

3.2-4.5        

 

                    CALCIUM CORRECTED           8.6 mg/dL           8.5-10.1    

    

 

                                        Fibrin D-dimer FEU measurement in platel

et poor plasma (mass/volume) - 20 

11:40         

 

                          Fibrin D-dimer FEU measurement in platelet poor plasma

 (mass/volume)           <

 ug/mL                                  0.00-0.49        

 

                                        Blood lactic acid measurement (moles/vol

ume) - 20 11:40         

 

                    Blood lactic acid measurement (moles/volume)           1.34 

mmol/L           

0.50-2.00        

 

                                        Serum ragweed IgE antibody assay -  11:40         

 

                    Serum ragweed IgE antibody assay           304 U/L          

   125-220        

 

                                        PROCALCITONIN (PCT) - 20 11:40    

     

 

                    PROCALCITONIN (PCT)           0.06 ng/mL           <0.10    

    

 

                                        Erythrocyte sedimentation rate by willy

gren method - 20 11:40         

 

                    Erythrocyte sedimentation rate by westergren method         

  88 mm               0-

20        

 

                                        Serum or plasma C reactive protein measu

rement (mass/volume) - 20 11:40   

      

 

                          Serum or plasma C reactive protein measurement (mass/v

olume)           11.20 

mg/dL                                   0.00-0.50        

 

                                        Complete urinalysis with reflex to cultu

re - 20 11:50         

 

                    Urine color determination           YELLOW              NRG 

       

 

                    Urine clarity determination           CLEAR               NR

G        

 

                    Urine pH measurement by test strip           6.0            

     5-9        

 

                    Specific gravity of urine by test strip           1.020     

          1.016-1.022  

      

 

                    Urine protein assay by test strip, semi-quantitative        

   2+                  

NEGATIVE        

 

                    Urine glucose detection by automated test strip           NE

GATIVE            

NEGATIVE        

 

                          Erythrocytes detection in urine sediment by light micr

oscopy           NEGATIVE 

                                        NEGATIVE        

 

                    Urine ketones detection by automated test strip           NE

GATIVE            

NEGATIVE        

 

                    Urine nitrite detection by test strip           NEGATIVE    

        NEGATIVE    

    

 

                    Urine total bilirubin detection by test strip           NEGA

TIVE            

NEGATIVE        

 

                          Urine urobilinogen measurement by automated test strip

 (mass/volume)           

2.0 mg/dL                               < = 1.0        

 

                    Urine leukocyte esterase detection by dipstick           NEG

ATIVE            

NEGATIVE        

 

                                        Automated urine sediment erythrocyte cou

nt by microscopy (number/high power 

field)                    NONE                      NRG        

 

                                        Automated urine sediment leukocyte count

 by microscopy (number/high power field)

                           [HPF]                    NRG        

 

                          Bacteria detection in urine sediment by light microsco

py           NEGATIVE     

                                        NRG        

 

                                        Squamous epithelial cells detection in u

rine sediment by light microscopy       

                          5-10                      NRG        

 

                          Crystals detection in urine sediment by light microsco

py           NONE         

                                        NRG        

 

                    Casts detection in urine sediment by light microscopy       

    NONE                

NRG        

 

                          Mucus detection in urine sediment by light microscopy 

          NEGATIVE        

                                        NRG        

 

                    Complete urinalysis with reflex to culture           NO     

             NRG        



                                                                              



Encounters

      



                ACCT No.           Visit Date/Time           Discharge          

 Status         

             Pt. Type           Provider           Facility           Loc./Unit 

          

Complaint        

 

                [id number]           2020 17:40:00           2020 23:59:5

9           CLS 

                Outpatient           HUNTER KO                        

   [facility]                                   

 

             [id number]           2020 08:20:00                              

       Document

 Registration                                                                   

 

 

             [id number]           2019 08:20:00                              

       Document

 Registration                                                                   

 

 

             [id number]           2019 08:40:00                              

       Document

 Registration                                                                   

 

 

             [id number]           2019 11:00:00                              

       Document

 Registration                                                                   

 

 

             [id number]           2018 09:00:00                              

       Document

 Registration                                                                   

 

 

             [id number]           2018 15:40:00                              

       Document

 Registration                                                                   

 

 

             [id number]           2018 12:20:00                              

       Document

 Registration                                                                   

 

 

             [id number]           2018 08:20:00                              

       Document

 Registration                                                                   

 

 

             [id number]           12/15/2017 09:00:00                              

       Document

 Registration                                                                   

 

 

                    [id number]           2018 17:45:00           

018 15:58:00        

                DIS             Outpatient           RANDALL NAVA MD           

[facility]           SDC                       RULE OUT APPENDICITIS  

      

 

                    [id number]           2018 09:53:00           

018 23:59:59        

                CLS             Outpatient           ZOIE JI       

    [facility]           RAD                       RLQ ABD PAIN        

 

                    [id number]           2018 15:44:00           

018 17:40:00        

                DIS             Emergency           ITALO BECKMAN MD          

 [facility]           ER                        LOSS OF VISION, COLD, S

HAKING - PRE 

DIABETIC        

 

                    [id number]           2014 15:22:00           

014 23:59:59        

                CLS             Outpatient           DEANA ALMEIDA MD          

 [facility]           RAD                       PELVIC PAIN LEFT OVARIA

N CYST      

  

 

                    [id number]           2014 12:00:00           

014 15:30:00        

                DIS             Outpatient           DEANA ALMEIDA MD          

 [facility]           SDC                       KIDNEY STONE        

 

                    [id number]           2014 09:11:00           

014 23:59:59        

                CLS             Outpatient           DEANA ALMEIDA MD          

 [facility]           RAD                       KIDNEY STONE,FLANK PAIN

        

 

                    [id number]           2013 17:06:00            23:59:59        

           CLS           Outpatient                                             

        

   

 

                    [id number]           2013 18:20:00           

013 21:29:00        

                DIS             Emergency           SHELLI PHIPPS       

    [facility]           ER                        R SIDE ABDOMINAL PAIN 

 KIDNEY 

STONES        

 

                    [id number]           2013 12:36:00           

013 13:30:00        

                DIS             Inpatient           DEANA ALMEIDA MD           

[facility]           WS                        INDUCTION        

 

                    [id number]           2013 12:52:00            23:59:59        

                CLS             Outpatient           DEANA ALMEIDA MD          

 [facility]           RAD                       FETAL POSITION,FETAL HE

ARTRATE     

   

 

             [id number]           2020 12:10:00                         

            

Document Registration                                                           

         

 

             [id number]           2013 10:08:00                         

            

Document Registration                                                           

         

 

             [id number]           2012 11:32:00                         

            

Document Registration

## 2020-07-09 NOTE — NUR
PT ADMITTED TO ROOM 424, FOR BILATERAL PNA, HYPOKALEMIA AND COVID PUI. PT A/O X4, ORIENTED 
TO ROOM/CALL LIGHT. VSS. OXYGEN 96% ON ROOM AIR. PT REPORTS DYSPNEA WITH MOVEMENT. 
ASSESSMENT AND HISTORY COMPLETED. CALL LIGHT WITHIN REACH.

## 2020-07-09 NOTE — XMS REPORT
[facility]

                             Created on: 2018



Saida Fermin

External Reference #: [id number]

: 1985

Sex: Female



Demographics





                          Address                   [address] [place]  [address]

 

                          Preferred Language        Unknown

 

                          Marital Status            Unknown

 

                          Mosque Affiliation     Unknown

 

                          Race                      Unknown

 

                          Ethnic Group              Unknown





Author





                          Author                    Saida RAUSCH

 

                          Organization              [facility]

 

                          Address                   [address] [place]  [address]



 

                          Phone                     [phone]







Care Team Providers





                    Care Team Member Name Role                Phone

 

                    SHALOMCHETELE     Unavailable         [phone]







PROBLEMS





          Type      Condition ICD9-CM Code VXQ59-FS Code Onset Dates Condition S

tatus SNOMED 

Code

 

          Problem   Obesity (BMI 30.0-34.9)           E66.9               Active

    [id number]

 

          Problem   Abnormal CBC measurement           R79.89              Activ

e    [id number]

 

          Problem   Other obesity due to excess calories           E66.09       

       Active    911109515

 

          Problem   Primary insomnia           F51.01              Active    397

2004

 

                Problem         Iron deficiency anemia secondary to inadequate d

ietary iron intake                 

D50.8                                   Active              [id number]

 

                Problem         Migraine without aura and without status migrain

osus, not intractable                 

G43.009                                 Active              [id number]

 

          Problem   Chronic fatigue           R53.82              Active    8422

9001

 

          Problem   Body mass index (BMI) of 34.0-34.9 in adult           Z68.34

              Active    349000191







ALLERGIES

No Information



ENCOUNTERS





                Encounter       Location        Date            Diagnosis

 

                          [facility]     [address] [id number]

[address] [place] [phone]

                          31 May, 2018              Iron deficiency anemia secon

adriano to inadequate dietary iron intake 

D50.8 ; Migraine without aura and without status migrainosus, not intractable 
G43.009 ; Other obesity due to excess calories E66.09 ; Body mass index (BMI) of
34.0-34.9 in adult Z68.34 ; Elevated fasting glucose R73.01 ; Primary insomnia 
F51.01 and Chronic fatigue R53.82

 

                          [facility]     [address] [facility] [id number]

[address] [place] [phone]

                          22 May, 2018              Encounter for Depo-Provera c

ontraception Z30.42

 

                          [facility]     [address] [facility] [id number]

[address] [place] [phone]

                                        Encounter for well woman exa

m Z01.419 ; Encounter for Depo-Provera 

contraception Z30.42 ; Obesity (BMI 30.0-34.9) E66.9 and Hyperinsulinemia E16.1

 

                          [place] [address] [address]

                                        Hyperinsulinemia E16.1

 

                          [place] [address] [address]

                                        Hyperinsulinemia E16.1

 

                          [place] [address] [address]

                                        Hyperinsulinemia E16.1 ; Zackery

hayes without aura and without status 

migrainosus, not intractable G43.009 ; Obesity (BMI 30.0-34.9) E66.9 and Iron 
deficiency anemia secondary to inadequate dietary iron intake D50.8

 

                          [place] [address] [address]

                          15 Dec, 2017              Hyperinsulinemia E16.1 ; Zackery

hayes without aura and without status 

migrainosus, not intractable G43.009 ; Obesity (BMI 30.0-34.9) E66.9 ; Iron 
deficiency anemia secondary to inadequate dietary iron intake D50.8 ; Oral 
contraceptive pill surveillance Z30.41 and Dysuria R30.0

 

                          [facility]     [address] [address]

                          15 Dec, 2017              Abnormal CBC R79.89

 

                          [facility]  [address] 

[address]                17 2017              Tachycardia R00.0

 

                          [facility]     [address] [address]

                          17 2017               

 

                          [place] [address] [address]

                          14 Sep, 2017              Hyperinsulinemia E16.1 ; Obe

sity (BMI 30.0-34.9) E66.9 and Migraine

without aura and without status migrainosus, not intractable G43.009

 

                          [place] [address] [place] [address]

                          15 Aug, 2017               

 

                          [facility]  [address] [id number]

[address] [place] [address] 

[address]                10 Aug, 2017              Acute non-recurrent maxillar

y sinusitis J01.00

 

                          [facility]   [address] [facility] [id number]

[address] [place] [phone]

                                        Dental examination Z01.20

 

                          [facility]   [id number] [place] [id number]

                          31 May, 2017              Dental examination Z01.20

 

                          [facility]     [id number]

[address] [place] [phone]

                          03 2017              Encounter for well woman exa

m with routine gynecological exam 

Z01.419 ; Encounter for screening for malignant neoplasm of cervix Z12.4 ; 
Encounter for screening breast examination Z12.39 ; Screen for STD (sexually 
transmitted disease) Z11.3 and Encounter for prescription of oral contraceptives
Z30.011

 

                          [facility]   [address] [facility] [id number]

[address] [place] [phone]

                                        Dental examination Z01.20

 

                          [facility]     [id number]

[address] [place] [phone]

                          19 Dec, 2016              Abnormal CBC R79.89

 

                          [facility]     [id number]

[address] [place] [phone]

                          12 Dec, 2016              Routine health maintenance Z

00.00 ; History of renal calculi 

Z87.442 ; Obesity (BMI 30.0-34.9) E66.9 and Family history of diabetes mellitus 
Z83.3

 

                          [facility]     [address] [id number]

[address] [place] [phone]

                          08 Dec, 2016              Routine health maintenance Z

00.00 ; History of renal calculi 

Z87.442 ; Obesity (BMI 30.0-34.9) E66.9 and Family history of diabetes mellitus 
Z83.3

 

                          [facility]  [address] [id number]

[address] [place] [address] 

[phone]                28 Sep, 2016              Irritable bowel syndrome wit

h diarrhea K58.0

 

                          [facility]   [address] [id number]

[address] [place] [phone]

                          24 Aug, 2016              Dental examination Z01.20

 

                          [facility]   [address] [facility] [id number]

[address] [place] [phone]

                                        Dental caries K02.9

 

                          [facility]   [address] [facility] [id number]

[address] [place] [phone]

                          24 May, 2016              Dental examination Z01.20







IMMUNIZATIONS

No Known Immunizations



SOCIAL HISTORY

Never Assessed



REASON FOR VISIT

Deferred Lab Order



PLAN OF CARE





VITAL SIGNS





MEDICATIONS

No Known Medications



RESULTS

No Results



PROCEDURES

No Known procedures



INSTRUCTIONS





MEDICATIONS ADMINISTERED

No Known Medications



MEDICAL (GENERAL) HISTORY





                    Type                Description         Date

 

                    Medical History     ERCP                 

 

                    Medical History     Renal calculi        

 

                    Medical History     Migraines            

 

                    Surgical History    kidney stone removed  

 

                    Hospitalization History kidney stones        

 

                          Hospitalization History   ER visit- itching all over b

madelin, blurry vision, SOB dxd 

migraine                                2018

## 2020-07-09 NOTE — XMS REPORT
[facility]

                             Created on: 2018



Saida Fermin

External Reference #: [id number]

: 1985

Sex: Female



Demographics





                          Address                   [address] [place]  [address]

 

                          Preferred Language        Unknown

 

                          Marital Status            Unknown

 

                          Judaism Affiliation     Unknown

 

                          Race                      Unknown

 

                          Ethnic Group              Unknown





Author





                          Author                    Saida RAUSCH

 

                          Organization              [facility]

 

                          Address                   [address] [place]  [address]



 

                          Phone                     [phone]







Care Team Providers





                    Care Team Member Name Role                Phone

 

                    SHALOMCHETELE     Unavailable         [phone]







PROBLEMS





          Type      Condition ICD9-CM Code BFT56-GD Code Onset Dates Condition S

tatus SNOMED 

Code

 

          Problem   Obesity (BMI 30.0-34.9)           E66.9               Active

    [id number]

 

          Problem   Abnormal CBC measurement           R79.89              Activ

e    [id number]

 

          Problem   Other obesity due to excess calories           E66.09       

       Active    093487965

 

          Problem   Primary insomnia           F51.01              Active    397

2004

 

                Problem         Iron deficiency anemia secondary to inadequate d

ietary iron intake                 

D50.8                                   Active              833251018

 

                Problem         Migraine without aura and without status migrain

osus, not intractable                 

G43.009                                 Active              198157709

 

          Problem   Chronic fatigue           R53.82              Active    8422

9001

 

          Problem   Body mass index (BMI) of 34.0-34.9 in adult           Z68.34

              Active    673828901







ALLERGIES

No Information



ENCOUNTERS





                Encounter       Location        Date            Diagnosis

 

                          [place] [address] [address] [facility] [id number]

[address] [place] [address]

                          11 Sep, 2018               

 

                          [place] [address] [facility] [id number]

[address] [place] [address]

                                         

 

                          [facility]     [address] [id number]

[address] [place] [phone]

                                        Right lower quadrant abdomin

al pain R10.31

 

                          [place] [address] [id number]

[address] [place] [address]

                          31 May, 2018              Iron deficiency anemia secon

adriano to inadequate dietary iron intake 

D50.8 ; Migraine without aura and without status migrainosus, not intractable 
G43.009 ; Other obesity due to excess calories E66.09 ; Body mass index (BMI) of
34.0-34.9 in adult Z68.34 ; Elevated fasting glucose R73.01 ; Primary insomnia 
F51.01 and Chronic fatigue R53.82

 

                          [facility]     [address] [address]

                          22 May, 2018              Encounter for Depo-Provera c

ontraception Z30.42

 

                          [place] [address] [address]

                                        Encounter for well woman exa

m Z01.419 ; Encounter for Depo-Provera 

contraception Z30.42 ; Obesity (BMI 30.0-34.9) E66.9 and Hyperinsulinemia E16.1

 

                          [place] [address] [address]

                                        Hyperinsulinemia E16.1

 

                          [place] [address] [address]

                                        Hyperinsulinemia E16.1

 

                          [place] [address] [address]

                                        Hyperinsulinemia E16.1 ; Zackery

hayes without aura and without status 

migrainosus, not intractable G43.009 ; Obesity (BMI 30.0-34.9) E66.9 and Iron 
deficiency anemia secondary to inadequate dietary iron intake D50.8

 

                          [place] [address] [address]

                          15 Dec, 2017              Hyperinsulinemia E16.1 ; Zackery

hayes without aura and without status 

migrainosus, not intractable G43.009 ; Obesity (BMI 30.0-34.9) E66.9 ; Iron 
deficiency anemia secondary to inadequate dietary iron intake D50.8 ; Oral 
contraceptive pill surveillance Z30.41 and Dysuria R30.0

 

                          [facility]     [address] [address]

                          15 Dec, 2017              Abnormal CBC R79.89

 

                          [facility]  [address] 

[address]                              Tachycardia R00.0

 

                          [place] [address] [address]

                          17 2017               

 

                          [facility]     [address] [address]

                          14 Sep, 2017              Hyperinsulinemia E16.1 ; Obe

sity (BMI 30.0-34.9) E66.9 and Migraine

without aura and without status migrainosus, not intractable G43.009

 

                          [facility]     [address] [address]

                          15 Aug, 2017               

 

                          [facility]  [address] 

[address]                10 Aug, 2017              Acute non-recurrent maxillar

y sinusitis J01.00

 

                          [facility]   [address] [id number]

                                        Dental examination Z01.20

 

                          [facility]   [address] [id number]

                          31 May, 2017              Dental examination Z01.20

 

                          [place] [address] [address]

                          03 2017              Encounter for well woman exa

m with routine gynecological exam 

Z01.419 ; Encounter for screening for malignant neoplasm of cervix Z12.4 ; 
Encounter for screening breast examination Z12.39 ; Screen for STD (sexually 
transmitted disease) Z11.3 and Encounter for prescription of oral contraceptives
Z30.011

 

                          [facility]   [id number] [place] [phone]

                                        Dental examination Z01.20

 

                          [place] [address] [address]

                          19 Dec, 2016              Abnormal CBC R79.89

 

                          [place] [address] [address]

                          12 Dec, 2016              Routine health maintenance Z

00.00 ; History of renal calculi 

Z87.442 ; Obesity (BMI 30.0-34.9) E66.9 and Family history of diabetes mellitus 
Z83.3

 

                          [place] [address]

[address] [place] [address]

                          08 Dec, 2016              Routine health maintenance Z

00.00 ; History of renal calculi 

Z87.442 ; Obesity (BMI 30.0-34.9) E66.9 and Family history of diabetes mellitus 
Z83.3

 

                          [facility]  [address] [facility] [id number]

[address] [place] [address] 

[address]                28 Sep, 2016              Irritable bowel syndrome wit

h diarrhea K58.0

 

                          [facility]   [address]

[address] [place] [phone]

                          24 Aug, 2016              Dental examination Z01.20

 

                          [facility]   [address] [place] [phone]

                                        Dental caries K02.9

 

                          [facility]   [address]

[address] [place] [phone]

                          24 May, 2016              Dental examination Z01.20







IMMUNIZATIONS





                Vaccine         Route           Administration Date Status

 

                DEPO PROVERA (150 MG/ML) IM Intramuscular May 22, 2018    Admini

stered







SOCIAL HISTORY

Never Assessed



REASON FOR VISIT

Depo Provera injection



PLAN OF CARE





VITAL SIGNS





MEDICATIONS

Unknown Medications



RESULTS





                Name            Result          Date            Reference Range

 

                PREGNANCY TEST, URINE (IN HOUSE)                 2018     

  

 

                RESULTS         negative                         

 

                Lot #           [id number]                          

 

                Control         +                                

 

                Exp date        19                         







PROCEDURES





                Procedure       Date Ordered    Result          Body Site

 

                DEPO PROVERA (150 MG/ML) May 22, 2018                     

 

                THER/PROPH/DIAG INJ, SC/IM May 22, 2018                     

 

                URINE PREGNANCY TEST May 22, 2018                     







INSTRUCTIONS





MEDICATIONS ADMINISTERED

No Known Medications



MEDICAL (GENERAL) HISTORY





                    Type                Description         Date

 

                    Medical History     ERCP                 

 

                    Medical History     Renal calculi        

 

                    Medical History     Migraines            

 

                    Surgical History    kidney stone removed  

 

                    Hospitalization History kidney stones        

 

                          Hospitalization History   ER visit- itching all over b

madelin, blurry vision, SOB dxd 

migraine                                2018

## 2020-07-09 NOTE — XMS REPORT
[facility]

                             Created on: 2018



Saida Fermin

External Reference #: [id number]

: 1985

Sex: Female



Demographics





                          Address                   [address] [place]  [address]

 

                          Preferred Language        Unknown

 

                          Marital Status            Unknown

 

                          M[employer]on Affiliation     Unknown

 

                          Race                      Unknown

 

                          Ethnic Group              Unknown





Author





                          Author                    Saida RAUSCH

 

                          Organization              [facility]

 

                          Address                   [address] [place]  [address]



 

                          Phone                     [phone]







Care Team Providers





                    Care Team Member Name Role                Phone

 

                    SHALOMCHETELE     Unavailable         [phone]







PROBLEMS





          Type      Condition ICD9-CM Code LZS96-NH Code Onset Dates Condition S

tatus SNOMED 

Code

 

          Problem   Obesity (BMI 30.0-34.9)           E66.9               Active

    [id number]

 

          Problem   Abnormal CBC measurement           R79.89              Activ

e    [id number]

 

          Problem   Other obesity due to excess calories           E66.09       

       Active    [id number]

 

          Problem   Primary insomnia           F51.01              Active    397

2004

 

                Problem         Iron deficiency anemia secondary to inadequate d

ietary iron intake                 

D50.8                                   Active              [id number]

 

                Problem         Migraine without aura and without status migrain

osus, not intractable                 

G43.009                                 Active              [id number]

 

          Problem   Chronic fatigue           R53.82              Active    8422

9001

 

          Problem   Body mass index (BMI) of 34.0-34.9 in adult           Z68.34

              Active    [id number]







ALLERGIES

No Known Allergies



ENCOUNTERS





                Encounter       Location        Date            Diagnosis

 

                          [place] [address] [id number]

[address] [place] [address]

                          11 Sep, 2018              Pre-diabetes R73.03 ; Iron d

eficiency anemia secondary to 

inadequate dietary iron intake D50.8 ; Chronic fatigue R53.82 and Obesity (BMI 
30.0-34.9) E66.9

 

                          [facility]     [address] [facility] [id number]

[address] [place] [address]

                          06 Sep, 2018              Encounter for Depo-Provera c

ontraception Z30.42

 

                          [facility]     [address] [id number]

[address] [place] [phone]

                                         

 

                          [facility]     [address] [facility] [id number]

[address] [place] [address]

                                        Right lower quadrant abdomin

al pain R10.31

 

                          [place] [id number]

[address] [place] [address]

                          31 May, 2018              Iron deficiency anemia secon

adriano to inadequate dietary iron intake 

D50.8 ; Migraine without aura and without status migrainosus, not intractable 
G43.009 ; Other obesity due to excess calories E66.09 ; Body mass index (BMI) of
34.0-34.9 in adult Z68.34 ; Elevated fasting glucose R73.01 ; Primary insomnia 
F51.01 and Chronic fatigue R53.82

 

                          [place] [address] [address]

                          22 May, 2018              Encounter for Depo-Provera c

ontraception Z30.42

 

                          [address] [address]

                          20 2018              Encounter for well woman exa

m Z01.419 ; Encounter for Depo-Provera 

contraception Z30.42 ; Obesity (BMI 30.0-34.9) E66.9 and Hyperinsulinemia E16.1

 

                          [place] [address] [address]

                          09 2018              Hyperinsulinemia E16.1

 

                          [address] [address]

                          08 2018              Hyperinsulinemia E16.1

 

                          [address] [address]

                          05 2018              Hyperinsulinemia E16.1 ; Zackery

hayes without aura and without status 

migrainosus, not intractable G43.009 ; Obesity (BMI 30.0-34.9) E66.9 and Iron 
deficiency anemia secondary to inadequate dietary iron intake D50.8

 

                          [address] [address]

                          15 Dec, 2017              Hyperinsulinemia E16.1 ; Zackery

hayes without aura and without status 

migrainosus, not intractable G43.009 ; Obesity (BMI 30.0-34.9) E66.9 ; Iron 
deficiency anemia secondary to inadequate dietary iron intake D50.8 ; Oral 
contraceptive pill surveillance Z30.41 and Dysuria R30.0

 

                          [place] [address] [address]

                          15 Dec, 2017              Abnormal CBC R79.89

 

                          [facility]  [address] 

[address]                              Tachycardia R00.0

 

                          [place] [address] [address]

                                         

 

                          [place] [address] [address]

                          14 Sep, 2017              Hyperinsulinemia E16.1 ; Obe

sity (BMI 30.0-34.9) E66.9 and Migraine

without aura and without status migrainosus, not intractable G43.009

 

                          [place] [address] [address]

                          15 Aug, 2017               

 

                          [facility] [place] [facility]  [address] 

[address]                10 Aug, 2017              Acute non-recurrent maxillar

y sinusitis J01.00

 

                          [facility]   [address] [place] [id number]

                                        Dental examination Z01.20

 

                          [facility]   [id number] [place] [id number]

                          31 May, 2017              Dental examination Z01.20

 

                          [facility]     [address] [address]

                          03 2017              Encounter for well woman exa

m with routine gynecological exam 

Z01.419 ; Encounter for screening for malignant neoplasm of cervix Z12.4 ; 
Encounter for screening breast examination Z12.39 ; Screen for STD (sexually 
transmitted disease) Z11.3 and Encounter for prescription of oral contraceptives
Z30.011

 

                          [facility]   [address] [place] [id number]

                                        Dental examination Z01.20

 

                          [facility]     [address] [address]

                          19 Dec, 2016              Abnormal CBC R79.89

 

                          [facility]     [address] [facility] [id number]

[address] [place] [address]

                          12 Dec, 2016              Routine health maintenance Z

00.00 ; History of renal calculi 

Z87.442 ; Obesity (BMI 30.0-34.9) E66.9 and Family history of diabetes mellitus 
Z83.3

 

                          [facility]     [address] [address]

                          08 Dec, 2016              Routine health maintenance Z

00.00 ; History of renal calculi 

Z87.442 ; Obesity (BMI 30.0-34.9) E66.9 and Family history of diabetes mellitus 
Z83.3

 

                          [facility]  [address] 

[address]                28 Sep, 2016              Irritable bowel syndrome wit

h diarrhea K58.0

 

                          [facility]   [address] [id number]

                          24 Aug, 2016              Dental examination Z01.20

 

                          [facility]   [address] [place] [id number]

                                        Dental caries K02.9

 

                          [facility]   [address] [id number]

                          24 May, 2016              Dental examination Z01.20







IMMUNIZATIONS

No Known Immunizations



SOCIAL HISTORY

Never Assessed



REASON FOR VISIT

f/u on anemia --NATHAN He



PLAN OF CARE





                          Activity                  Details

 

                                         

 

                          Follow Up                 6 Months, prn Reason:CHM/Pre

diabetes w/Nahomy







VITAL SIGNS





                    Height              61 in               2018

 

                    Weight              180.6 lbs           2018

 

                    Temperature         98.1 degrees Fahrenheit 2018

 

                    Heart Rate          72 bpm              2018

 

                    Respiratory Rate    18                  2018

 

                    BMI                 34.12 kg/m2         2018

 

                    Blood pressure systolic 122 mmHg            2018

 

                    Blood pressure diastolic 66 mmHg             2018







MEDICATIONS





        Medication Instructions Dosage  Frequency Start Date End Date Duration S

tatus

 

        Metformin HCl 500 mg Orally 2 times a day 1 tablet 12h     19 Dec, 2016 

                Active







RESULTS

No Results



PROCEDURES





                Procedure       Date Ordered    Result          Body Site

 

                GLYCATED HEMOGLOBIN TEST 2018                    

 

                COMPLETE CBC W/AUTO DIFF WBC 2018                    

 

                ASSAY OF VITAMIN D 2018                    







INSTRUCTIONS





MEDICATIONS ADMINISTERED

No Known Medications



MEDICAL (GENERAL) HISTORY





                    Type                Description         Date

 

                    Medical History     ERCP                 

 

                    Medical History     Renal calculi        

 

                    Medical History     Migraines            

 

                    Surgical History    kidney stone removed  

 

                    Hospitalization History kidney stones        

 

                          Hospitalization History   ER visit- itching all over b

madelin, blurry vision, SOB dxd 

migraine                                2018

## 2020-07-09 NOTE — XMS REPORT
[facility]

                             Created on: 2017



Saida Fermin

External Reference #: [id number]

: 1985

Sex: Female



Demographics





                          Address                   [address] [place]  [address]

 

                          Preferred Language        Unknown

 

                          Marital Status            Unknown

 

                          Shinto Affiliation     Unknown

 

                          Race                      Unknown

 

                          Ethnic Group              Unknown





Author





                          Author                    Saida RAUSCH

 

                          Organization              [facility]

 

                          Address                   [address] [place]  [address]



 

                          Phone                     [phone]







Care Team Providers





                    Care Team Member Name Role                Phone

 

                    CAMERON RAUSCH     Unavailable         [phone]







PROBLEMS





          Type      Condition ICD9-CM Code FIG27-BW Code Onset Dates Condition S

tatus SNOMED 

Code

 

          Problem   Pre-diabetes           R73.03              Active    0557429

02

 

          Problem   Abnormal CBC measurement           R79.89              Activ

e    [id number]

 

          Problem   Routine health maintenance           Z00.00              Act

nicole    [id number]

 

          Problem   History of renal calculi           Z87.442             Activ

e    [id number]

 

          Problem   Dental examination           Z01.20              Active    1

[id number]

 

          Problem   Hyperinsulinemia           E16.1               Active    834

00862

 

          Problem   Obesity (BMI 30.0-34.9)           E66.9               Active

    [id number]

 

          Problem   Family history of diabetes mellitus           Z83.3         

      Active    295160079







ALLERGIES

Unknown Allergies



SOCIAL HISTORY

No smoking Hx information available



PLAN OF CARE





VITAL SIGNS





MEDICATIONS





        Medication Instructions Dosage  Frequency Start Date End Date Duration S

nadine

 

                    Metformin HCl 500 MG Orally Daily with evening meal x1 week,

 then Twice a day 1 

tablet with meals              19 Dec, 2016              30 day(s)    Active







RESULTS

No Results



PROCEDURES

No Known procedures



IMMUNIZATIONS

No Known Immunizations

## 2020-07-09 NOTE — XMS REPORT
[facility]

                             Created on: 2018



Saida Fermin

External Reference #: [id number]

: 1985

Sex: Female



Demographics





                          Address                   [address] [place]  [address]

 

                          Preferred Language        Unknown

 

                          Marital Status            Unknown

 

                          Gnosticist Affiliation     Unknown

 

                          Race                      Unknown

 

                          Ethnic Group              Unknown





Author





                          Author                    Saida RAUSCH

 

                          Organization              [facility]

 

                          Address                   [address] [place]  [address]



 

                          Phone                     [phone]







Care Team Providers





                    Care Team Member Name Role                Phone

 

                    RAUSCHCHET HAASELE     Unavailable         [phone]







PROBLEMS





          Type      Condition ICD9-CM Code TRU65-MD Code Onset Dates Condition S

tatus SNOMED 

Code

 

          Problem   Obesity (BMI 30.0-34.9)           E66.9               Active

    [id number]

 

          Problem   Abnormal CBC measurement           R79.89              Activ

e    [id number]

 

          Problem   Other obesity due to excess calories           E66.09       

       Active    [id number]

 

          Problem   Primary insomnia           F51.01              Active    397

2004

 

                Problem         Iron deficiency anemia secondary to inadequate d

ietary iron intake                 

D50.8                                   Active              [id number]

 

                Problem         Migraine without aura and without status migrain

osus, not intractable                 

G43.009                                 Active              [id number]

 

          Problem   Chronic fatigue           R53.82              Active    8422

9001

 

          Problem   Body mass index (BMI) of 34.0-34.9 in adult           Z68.34

              Active    [id number]







ALLERGIES

No Known Allergies



ENCOUNTERS





                Encounter       Location        Date            Diagnosis

 

                          [facility]     [address] [id number]

[address] [place] [id number]

                          31 May, 2018              Iron deficiency anemia secon

adriano to inadequate dietary iron intake 

D50.8 ; Migraine without aura and without status migrainosus, not intractable 
G43.009 ; Other obesity due to excess calories E66.09 ; Body mass index (BMI) of
34.0-34.9 in adult Z68.34 ; Elevated fasting glucose R73.01 ; Primary insomnia 
F51.01 and Chronic fatigue R53.82

 

                          [facility]     [address] [facility] [id number]

[address] [place] [address]

                          22 May, 2018              Encounter for Depo-Provera c

ontraception Z30.42

 

                          [facility]     [address] [facility] [id number]

[address] [place] [address]

                          20 2018              Encounter for well woman exa

m Z01.419 ; Encounter for Depo-Provera 

contraception Z30.42 ; Obesity (BMI 30.0-34.9) E66.9 and Hyperinsulinemia E16.1

 

                          [place] [address] [address]

                                        Hyperinsulinemia E16.1

 

                          [place] [address] [address]

                                        Hyperinsulinemia E16.1

 

                          [place] [address] [phone]

                                        Hyperinsulinemia E16.1 ; Zackery

hayes without aura and without status 

migrainosus, not intractable G43.009 ; Obesity (BMI 30.0-34.9) E66.9 and Iron 
deficiency anemia secondary to inadequate dietary iron intake D50.8

 

                          [place] [address] [address]

                          15 Dec, 2017              Hyperinsulinemia E16.1 ; Zackery

hayes without aura and without status 

migrainosus, not intractable G43.009 ; Obesity (BMI 30.0-34.9) E66.9 ; Iron 
deficiency anemia secondary to inadequate dietary iron intake D50.8 ; Oral 
contraceptive pill surveillance Z30.41 and Dysuria R30.0

 

                          [facility]     [address] [address]

                          15 Dec, 2017              Abnormal CBC R79.89

 

                          [facility]  [address] 

[phone]                17 2017              Tachycardia R00.0

 

                          [facility]     [address] [address]

                                         

 

                          [place] [address] [address]

                          14 Sep, 2017              Hyperinsulinemia E16.1 ; Obe

sity (BMI 30.0-34.9) E66.9 and Migraine

without aura and without status migrainosus, not intractable G43.009

 

                          [place] [address] [place] [address]

                          15 Aug, 2017               

 

                          [facility]  [address] [id number]

[address] [place] [address] 

[address]                10 Aug, 2017              Acute non-recurrent maxillar

y sinusitis J01.00

 

                          [facility]   [address] [id number]

[address] [place] [id number]

                                        Dental examination Z01.20

 

                          [facility]   [id number] [place] [id number]

                          31 May, 2017              Dental examination Z01.20

 

                          [facility]     [address] [id number]

[address] [place] [address]

                          03 2017              Encounter for well woman exa

m with routine gynecological exam 

Z01.419 ; Encounter for screening for malignant neoplasm of cervix Z12.4 ; 
Encounter for screening breast examination Z12.39 ; Screen for STD (sexually 
transmitted disease) Z11.3 and Encounter for prescription of oral contraceptives
Z30.011

 

                          [facility]   [address] [facility] [id number]

[address] [place] [id number]

                                        Dental examination Z01.20

 

                          [facility]     [address] [id number]

[address] [place] [address]

                          19 Dec, 2016              Abnormal CBC R79.89

 

                          [facility]     [address] [id number]

[address] [place] [phone]

                          12 Dec, 2016              Routine health maintenance Z

00.00 ; History of renal calculi 

Z87.442 ; Obesity (BMI 30.0-34.9) E66.9 and Family history of diabetes mellitus 
Z83.3

 

                          [facility]     [address] [id number]

[address] [place] [phone]

                          08 Dec, 2016              Routine health maintenance Z

00.00 ; History of renal calculi 

Z87.442 ; Obesity (BMI 30.0-34.9) E66.9 and Family history of diabetes mellitus 
Z83.3

 

                          [facility]  [address] [id number]

[address] [place] [address] 

[phone]                28 Sep, 2016              Irritable bowel syndrome wit

h diarrhea K58.0

 

                          [facility]   [address] [id number]

[address] [place] [phone]

                          24 Aug, 2016              Dental examination Z01.20

 

                          [facility]   [address] [id number]

[address] [place] [phone]

                                        Dental caries K02.9

 

                          [facility]   [address] [id number]

[address] [place] [phone]

                          24 May, 2016              Dental examination Z01.20







IMMUNIZATIONS

No Known Immunizations



SOCIAL HISTORY

Never Assessed



REASON FOR VISIT

Diabetes  fu -- rochelle david



PLAN OF CARE





                          Activity                  Details

 

                                         

 

                          Follow Up                 3 Months Reason:[facility]







VITAL SIGNS





                    Height              61 in               2017-12-15

 

                    Weight              176.0 lbs           2017-12-15

 

                    Temperature         98 degrees Fahrenheit 2017-12-15

 

                    Heart Rate          80 bpm              2017-12-15

 

                    Respiratory Rate    20                  2017-12-15

 

                    BMI                 33.25 kg/m2         2017-12-15

 

                    Blood pressure systolic 122 mmHg            2017-12-15

 

                    Blood pressure diastolic 66 mmHg             2017-12-15







MEDICATIONS





        Medication Instructions Dosage  Frequency Start Date End Date Duration S

tatus

 

        Multi For Her -                                                 Active

 

        Metformin HCl 500 mg Orally 2 times a day 1 tablet 12h     19 Dec, 2016 

        90 days 

Active

 

          Mononessa 0.25-35 MG-MCG Orally Once a day 1 tablet  24h                  28 day(s)

                                        Active







RESULTS





                Name            Result          Date            Reference Range

 

                A1C (IN HOUSE)                  2017-12-15       

 

                A1C IN HOUSE    5.3                             4.3 - 5.6 %

 

                Previous A1c    5.8                              

 

                Lot             [id number]                             

 

                Exp date        2019                          

 

                PREGNANCY TEST, URINE (IN HOUSE)                 2017-12-15     

  

 

                RESULTS         neg                              

 

                Lot #           [id number]                          

 

                Control         +                                

 

                Exp date        2019                       

 

                UA LONG DIP (IN HOUSE)                 2017-12-15       

 

                Lot #           [id number]                           

 

                Exp date        2018                       

 

                Clarity         Clear                            

 

                Color           Yellow                           

 

                Odor            None                             

 

                GLU             Negative                         

 

                TRENA             Negative                         

 

                KET             Negative                         

 

                SG              1.010                            

 

                BLO             Negative                         

 

                pH              5.0                              

 

                Protein         Negative                         

 

                URO             0.2                              

 

                NIT             Negative                         

 

                HAYLEY             Negative                         

 

                Lot #                                            

 

                Exp date                                         







PROCEDURES





                Procedure       Date Ordered    Result          Body Site

 

                GLYCATED HEMOGLOBIN TEST Dec 15, 2017                     

 

                URINE PREGNANCY TEST Dec 15, 2017                     

 

                URINALYSIS, AUTO, W/O SCOPE Dec 15, 2017                     







INSTRUCTIONS





MEDICATIONS ADMINISTERED

No Known Medications



MEDICAL (GENERAL) HISTORY





                    Type                Description         Date

 

                    Medical History     ERCP                 

 

                    Medical History     Renal calculi        

 

                    Medical History     Migraines            

 

                    Surgical History    kidney stone removed  

 

                    Hospitalization History kidney stones        

 

                          Hospitalization History   ER visit- itching all over b

madelin, blurry vision, SOB dxd 

migraine                                2018

## 2020-07-09 NOTE — XMS REPORT
[facility]

                             Created on: 2020



Saida Martinez

External Reference #: [id number]

: 1985

Sex: Female



Demographics





                          Address                   [address] [place]  [address]

 

                          Preferred Language        Unknown

 

                          Marital Status            Unknown

 

                          Taoist Affiliation     Unknown

 

                          Race                      Unknown

 

                          Ethnic Group              Unknown





Author





                          Author                    Saida KO

 

                          [facility]

 

                          Address                   [address] [place]  [address]



 

                          Phone                     [phone]







Care Team Providers





                    Care Team Member Name Role                Phone

 

                    HUNTER KO  Unavailable         [phone]







PROBLEMS





          Type      Condition ICD9-CM Code HSX74-VO Code Onset Dates Condition S

tatus SNOMED 

Code

 

          Problem   Obesity (BMI 30.0-34.9)           E66.9               Active

    [id number]

 

                Problem         Migraine without aura and without status migrain

osus, not intractable                 

G43.009                                 Active              [id number]

 

                Problem         Iron deficiency anemia secondary to inadequate d

ietary iron intake                 

D50.8                                   Active              [id number]

 

          Problem   Kidney stone           N20.0               Active    6037045

7

 

          Problem   Abnormal CBC measurement           R79.89              Activ

e    [id number]

 

          Problem   Pre-diabetes           R73.03              Active    5605626

02

 

          Problem   Chronic fatigue           R53.82              Active    8422

9001

 

          Problem   Primary insomnia           F51.01              Active    397

2004

 

          Problem   Other obesity due to excess calories           E66.09       

       Active    411267521

 

          Problem   Body mass index (BMI) of 34.0-34.9 in adult           Z68.34

              Active    610434464







ALLERGIES

No Known Allergies



ENCOUNTERS





                Encounter       Location        Date            Diagnosis

 

                    [facility] [id number] [place] [address]                                Encounter for Depo-Provera contraception

 Z30.42

 

                    [facility] [address] [facility] [place] [address] 30 

Oct, 2019                               Encounter for Depo-Provera contraception

 Z30.42

 

                          [facility] [place] [facility]  [address] [facility] [id number]

[address], [place] 

[address]                25 Oct, 2019              Encounter for immunization Z

23

 

                    [facility] [address] [facility] [place] [address] 09 

Aug, 2019                               Encounter for Depo-Provera contraception

 Z30.42

 

                    [address] [place] [id number] [place] [address] 20 

2019                               Screening examination for sexually trans

mitted disease Z11.3 ; Well 

woman exam Z01.419 ; Pre-diabetes R73.03 and Acute vaginitis N76.0

 

                    [address] [address] 20 

May, 2019                               Encounter for Depo-Provera contraception

 Z30.42

 

                          [facility] [place] [id number]

[address] [place] [address] 

[address]                              Acute flank pain R10.9 ; Kid

precious stone N20.0 and 

Hematuria, unspecified type R31.9

 

                    [address] [address] 21 

Mar, 2019                               Contraception management Z30.9 ; Contrac

eptive education Z30.09 ; Pre-

diabetes R73.03 and Migraine without aura and without status migrainosus, not 
intractable G43.009

 

                    [address] [address]                                Encounter for Depo-Provera contraception

 Z30.42

 

                          [facility] [place] [id number]

[address] [place] [address] 

[address]                              Acute non-recurrent pansinus

itis J01.40

 

                          [address] 

[address]                              Encounter for Depo-Provera c

ontraception Z30.42

 

                    [address] [address] 11 

Sep, 2018                               Pre-diabetes R73.03 ; Iron deficiency an

emia secondary to inadequate 

dietary iron intake D50.8 ; Chronic fatigue R53.82 and Obesity (BMI 30.0-34.9) 
E66.9

 

                    [address] [address] 06 

Sep, 2018                               Encounter for Depo-Provera contraception

 Z30.42

 

                    [id number] [place] [address]                                 

 

                    [address] [address]                                Right lower quadrant abdominal pain R10.

31

 

                    [address] [address] 31 

May, 2018                               Iron deficiency anemia secondary to inad

equate dietary iron intake 

D50.8 ; Migraine without aura and without status migrainosus, not intractable 
G43.009 ; Other obesity due to excess calories E66.09 ; Body mass index (BMI) of
34.0-34.9 in adult Z68.34 ; Elevated fasting glucose R73.01 ; Primary insomnia 
F51.01 and Chronic fatigue R53.82

 

                    [address] [address] 22 

May, 2018                               Encounter for Depo-Provera contraception

 Z30.42

 

                    [address] [address] 20 

2018                               Encounter for well woman exam Z01.419 ; 

Encounter for Depo-Provera 

contraception Z30.42 ; Obesity (BMI 30.0-34.9) E66.9 and Hyperinsulinemia E16.1

 

                    [address] [address]                                Hyperinsulinemia E16.1

 

                    [address] [address]                                Hyperinsulinemia E16.1

 

                    [address] [address]                                Hyperinsulinemia E16.1 ; Migraine withou

t aura and without status 

migrainosus, not intractable G43.009 ; Obesity (BMI 30.0-34.9) E66.9 and Iron 
deficiency anemia secondary to inadequate dietary iron intake D50.8

 

                    [address] [address] 15 

Dec, 2017                               Hyperinsulinemia E16.1 ; Migraine withou

t aura and without status 

migrainosus, not intractable G43.009 ; Obesity (BMI 30.0-34.9) E66.9 ; Iron 
deficiency anemia secondary to inadequate dietary iron intake D50.8 ; Oral 
contraceptive pill surveillance Z30.41 and Dysuria R30.0

 

                    [place] [address] [address] 15 

Dec, 2017                               Abnormal CBC R79.89

 

                          [facility] [place] [facility]  [address]

[address] [place] [address] 

[address]                              Tachycardia R00.0

 

                    [address] [place] [address] [address] 17 

2017                                

 

                    [place] [id number] 

Sep, 2017                               Hyperinsulinemia E16.1 ; Obesity (BMI 30

.0-34.9) E66.9 and Migraine 

without aura and without status migrainosus, not intractable G43.009

 

                    [place] [address] [address] 15 

Aug, 2017                                

 

                          [facility] [place] [facility]  [address] [place], [place] [id number]

[address] [place] [address] 

[address]                10 Aug, 2017              Acute non-recurrent maxillar

y sinusitis J01.00

 

                    [address] [id number]                                Dental examination Z01.20

 

                    [facility] [address] [place] [id number] 

May, 2017                               Dental examination Z01.20

 

                    [address] [address] 03 

2017                               Encounter for well woman exam with rosaliai

ne gynecological exam Z01.419 

; Encounter for screening for malignant neoplasm of cervix Z12.4 ; Encounter for
screening breast examination Z12.39 ; Screen for STD (sexually transmitted 
disease) Z11.3 and Encounter for prescription of oral contraceptives Z30.011

 

                    [facility] [address] [id number]                                Dental examination Z01.20

 

                    [place] [address] [address] [place] [address] 19 

Dec, 2016                               Abnormal CBC R79.89

 

                    [facility] [address] [address] 12 

Dec, 2016                               Routine health maintenance Z00.00 ; Hist

ory of renal calculi Z87.442 ;

Obesity (BMI 30.0-34.9) E66.9 and Family history of diabetes mellitus Z83.3

 

                    [facility] [address] [address] 08 

Dec, 2016                               Routine health maintenance Z00.00 ; Hist

ory of renal calculi Z87.442 ;

Obesity (BMI 30.0-34.9) E66.9 and Family history of diabetes mellitus Z83.3

 

                          [facility] WALK IN [facility]  [address] [facility] [id number]

[address] [place] 

[address]                28 Sep, 2016              Irritable bowel syndrome wit

h diarrhea K58.0

 

                    [facility] [address] [id number] 24 

Aug, 2016                               Dental examination Z01.20

 

                    [facility] [address] [id number]                                Dental caries K02.9

 

                    [facility] [address] [id number] 24 

May, 2016                               Dental examination Z01.20







IMMUNIZATIONS

No Known Immunizations



SOCIAL HISTORY

Never Assessed



REASON FOR VISIT

[facility]-NATHAN Sloan, Birth Control consult/ wants to continue leopoldo amaya MA



PLAN OF CARE





                          Activity                  Details

 

                                         

 

                          Follow Up                 2-3 mos Reason:migraine







VITAL SIGNS





                    Height              61 in               2019

 

                    Weight              182.8 lbs           2019

 

                    Temperature         97.1 degrees Fahrenheit 2019

 

                    Heart Rate          107 bpm             2019

 

                    Respiratory Rate    20                  2019

 

                    BMI                 34.54 kg/m2         2019

 

                    Blood pressure systolic 118 mmHg            2019

 

                    Blood pressure diastolic 62 mmHg             2019







MEDICATIONS





        Medication Instructions Dosage  Frequency Start Date End Date Duration S

tatus

 

        Vitamin D                                                 Active

 

        Propranolol HCl ER 80 MG Orally Once a day 1 capsule 24h     21 Mar, 201

9         90 days 

Active

 

        Depo-Provera                                                 Active

 

        Metformin HCl 500 mg Orally 2 times a day 1 tablet 12h     19 Dec, 2016 

        90 days 

Active







RESULTS

No Results



PROCEDURES





                Procedure       Date Ordered    Result          Body Site

 

                VENIPUNCT, ROUTINE* 2019                   

 

                GLYCATED HEMOGLOBIN TEST 2019                   

 

                LIPID PANEL     2019                   

 

                ASSAY THYROID STIM HORMONE 2019                   

 

                COMPREHEN METABOLIC PANEL 2019                   







INSTRUCTIONS





MEDICATIONS ADMINISTERED

No Known Medications



MEDICAL (GENERAL) HISTORY





                    Type                Description         Date

 

                    Medical History     ERCP                 

 

                    Medical History     Renal calculi        

 

                    Medical History     Migraines            

 

                    Surgical History    kidney stone removed  

 

                    Surgical History    appendectomy         

 

                    Hospitalization History kidney stones        

 

                          Hospitalization History   ER visit- itching all over b

madelin, blurry vision, SOB dxd 

migraine                                2018

 

                    Hospitalization History childbirth

## 2020-07-09 NOTE — NUR
PT REPORTING CONTINUED PAIN IN CHEST/ACROSS DIAPHRAGM. RESP 40, OXYGEN 93% ON RA.  2LNC 
APPLIED. DR CAMPUZANO NOTIFIED 

ORDER RECEIVED FOR MORPHINE 1MG Q2HRS.

## 2020-07-09 NOTE — XMS REPORT
[facility]

                             Created on: 2018



Saida Fermin

External Reference #: [id number]

: 1985

Sex: Female



Demographics





                          Address                   [address] [place]  [address]

 

                          Preferred Language        Unknown

 

                          Marital Status            Unknown

 

                          Oriental orthodox Affiliation     Unknown

 

                          Race                      Unknown

 

                          Ethnic Group              Unknown





Author





                          Author                    Saida CAMPBELL

 

                          [facility]

 

                          Address                   [address] [place]  [address]



 

                          Phone                     [phone]







Care Team Providers





                    Care Team Member Name Role                Phone

 

                    VICKI CAMPBELL     Unavailable         [phone]







PROBLEMS





          Type      Condition ICD9-CM Code FMS53-FL Code Onset Dates Condition S

tatus SNOMED 

Code

 

          Problem   Obesity (BMI 30.0-34.9)           E66.9               Active

    [id number]

 

          Problem   Abnormal CBC measurement           R79.89              Activ

e    [id number]

 

          Problem   Other obesity due to excess calories           E66.09       

       Active    [id number]

 

          Problem   Primary insomnia           F51.01              Active    397

2004

 

                Problem         Iron deficiency anemia secondary to inadequate d

ietary iron intake                 

D50.8                                   Active              [id number]

 

                Problem         Migraine without aura and without status migrain

osus, not intractable                 

G43.009                                 Active              [id number]

 

          Problem   Chronic fatigue           R53.82              Active    8422

9001

 

          Problem   Body mass index (BMI) of 34.0-34.9 in adult           Z68.34

              Active    [id number]







ALLERGIES

No Information



ENCOUNTERS





                Encounter       Location        Date            Diagnosis

 

                          [facility]  [address] [id number]

[address] [place] [address] 

[address]                              Encounter for Depo-Provera c

ontraception Z30.42

 

                          [facility], [place]     [address] [id number]

[address] [place] [address]

                          11 Sep, 2018              Pre-diabetes R73.03 ; Iron d

eficiency anemia secondary to 

inadequate dietary iron intake D50.8 ; Chronic fatigue R53.82 and Obesity (BMI 
30.0-34.9) E66.9

 

                          [facility], [place]     [address] [id number]

[address] [place] [address]

                          06 Sep, 2018              Encounter for Depo-Provera c

ontraception Z30.42

 

                          [facility], [place]     [address] [id number]

[address] [place] [address]

                                         

 

                          [place] [id number]

[address] [place] [address]

                                        Right lower quadrant abdomin

al pain R10.31

 

                          [place] [id number]

[address] [place] [address]

                          31 May, 2018              Iron deficiency anemia secon

adriano to inadequate dietary iron intake 

D50.8 ; Migraine without aura and without status migrainosus, not intractable 
G43.009 ; Other obesity due to excess calories E66.09 ; Body mass index (BMI) of
34.0-34.9 in adult Z68.34 ; Elevated fasting glucose R73.01 ; Primary insomnia 
F51.01 and Chronic fatigue R53.82

 

                          [place] [address] [address]

                          22 May, 2018              Encounter for Depo-Provera c

ontraception Z30.42

 

                          [address] [address]

                          20 2018              Encounter for well woman exa

m Z01.419 ; Encounter for Depo-Provera 

contraception Z30.42 ; Obesity (BMI 30.0-34.9) E66.9 and Hyperinsulinemia E16.1

 

                          [place] [address] [address]

                                        Hyperinsulinemia E16.1

 

                          [address] [place] [address] [address]

                                        Hyperinsulinemia E16.1

 

                          [place] [address] [address]

                                        Hyperinsulinemia E16.1 ; Zackery

hayes without aura and without status 

migrainosus, not intractable G43.009 ; Obesity (BMI 30.0-34.9) E66.9 and Iron 
deficiency anemia secondary to inadequate dietary iron intake D50.8

 

                          [place] [id number]

[address] [place] [address]

                          15 Dec, 2017              Hyperinsulinemia E16.1 ; Zackery

hayes without aura and without status 

migrainosus, not intractable G43.009 ; Obesity (BMI 30.0-34.9) E66.9 ; Iron 
deficiency anemia secondary to inadequate dietary iron intake D50.8 ; Oral 
contraceptive pill surveillance Z30.41 and Dysuria R30.0

 

                          [facility], [place]     [address] [address]

                          15 Dec, 2017              Abnormal CBC R79.89

 

                          [facility] [place] [facility]  [address] 

[phone]                17 2017              Tachycardia R00.0

 

                          [facility], [place]     [address] [address]

                          17 2017               

 

                          [facility], [place]     [address] [address]

                          14 Sep, 2017              Hyperinsulinemia E16.1 ; Obe

sity (BMI 30.0-34.9) E66.9 and Migraine

without aura and without status migrainosus, not intractable G43.009

 

                          [facility], [place]     [address] [address]

                          15 Aug, 2017               

 

                          [facility] [place] [facility]  [address] 

[address]                10 Aug, 2017              Acute non-recurrent maxillar

y sinusitis J01.00

 

                          [facility]   [address] [id number]

[address] [place] [id number]

                                        Dental examination Z01.20

 

                          [facility]   [address] [place] [id number]

                          31 May, 2017              Dental examination Z01.20

 

                          [facility], [place]     [id number]

[address] [place] [address]

                          03 2017              Encounter for well woman exa

m with routine gynecological exam 

Z01.419 ; Encounter for screening for malignant neoplasm of cervix Z12.4 ; 
Encounter for screening breast examination Z12.39 ; Screen for STD (sexually 
transmitted disease) Z11.3 and Encounter for prescription of oral contraceptives
Z30.011

 

                          [facility]   [address] [id number]

[address] [place] [id number]

                                        Dental examination Z01.20

 

                          [facility], [place]     [address] [facility] [id number]

[address] [place] [address]

                          19 Dec, 2016              Abnormal CBC R79.89

 

                          [facility], [place]     [address] [facility] [id number]

[address] [place] [address]

                          12 Dec, 2016              Routine health maintenance Z

00.00 ; History of renal calculi 

Z87.442 ; Obesity (BMI 30.0-34.9) E66.9 and Family history of diabetes mellitus 
Z83.3

 

                          [facility], [place]     [address] [facility] [id number]

[address] [place] [address]

                          08 Dec, 2016              Routine health maintenance Z

00.00 ; History of renal calculi 

Z87.442 ; Obesity (BMI 30.0-34.9) E66.9 and Family history of diabetes mellitus 
Z83.3

 

                          [facility]  [address] [facility] [id number]

[address] [place] [address] 

[address]                28 Sep, 2016              Irritable bowel syndrome wit

h diarrhea K58.0

 

                          [facility]   [address] [facility] [id number]

[address] [place] [id number]

                          24 Aug, 2016              Dental examination Z01.20

 

                          [facility]   [address] [facility] [id number]

[address] [place] [id number]

                                        Dental caries K02.9

 

                          [facility]   [address] [facility] [id number]

[address] [place] [phone]

                          24 May, 2016              Dental examination Z01.20







IMMUNIZATIONS





                Vaccine         Route           Administration Date Status

 

                DEPO PROVERA (150 MG/ML) IM Intramuscular 2018    Admini

stered







SOCIAL HISTORY

Never Assessed



REASON FOR VISIT

Depo Provera injection-Luis EVANS



PLAN OF CARE





                          Activity                  Details

 

                                         

 

                          Follow Up                 3 Months Reason:







VITAL SIGNS





MEDICATIONS

Unknown Medications



RESULTS





                Name            Result          Date            Reference Range

 

                PREGNANCY TEST, URINE (IN HOUSE)                                

  

 

                RESULTS         negative                         

 

                Lot #           [id number]                          

 

                Control         pos                              

 

                Exp date        2020                          







PROCEDURES





                Procedure       Date Ordered    Result          Body Site

 

                URINE PREGNANCY TEST 2018                     

 

                DEPO PROVERA (150 MG/ML) 2018                     

 

                THER/PROPH/DIAG INJ, SC/IM 2018                     







INSTRUCTIONS





MEDICATIONS ADMINISTERED

No Known Medications



MEDICAL (GENERAL) HISTORY





                    Type                Description         Date

 

                    Medical History     ERCP                 

 

                    Medical History     Renal calculi        

 

                    Medical History     Migraines            

 

                    Surgical History    kidney stone removed  

 

                    Hospitalization History kidney stones        

 

                          Hospitalization History   ER visit- itching all over b

madelin, blurry vision, SOB dxd 

migraine                                2018

## 2020-07-09 NOTE — XMS REPORT
[facility]

                             Created on: 2018



Saida Fermin

External Reference #: [id number]

: 1985

Sex: Female



Demographics





                          Address                   [address] [place]  [address]

 

                          Preferred Language        Unknown

 

                          Marital Status            Unknown

 

                          Roman Catholic Affiliation     Unknown

 

                          Race                      Unknown

 

                          Ethnic Group              Unknown





Author





                          Author                    Saida SOLITARIO

 

                          Organization              [facility]

 

                          Address                   [address] [place]  [address]



 

                          Phone                     [phone]







Care Team Providers





                    Care Team Member Name Role                Phone

 

                    ZOIE SOLITARIO    Unavailable         [phone]







PROBLEMS





          Type      Condition ICD9-CM Code YSI63-FQ Code Onset Dates Condition S

tatus SNOMED 

Code

 

          Problem   Obesity (BMI 30.0-34.9)           E66.9               Active

    [id number]

 

          Problem   Abnormal CBC measurement           R79.89              Activ

e    [id number]

 

          Problem   Other obesity due to excess calories           E66.09       

       Active    [id number]

 

          Problem   Primary insomnia           F51.01              Active    397

2004

 

                Problem         Iron deficiency anemia secondary to inadequate d

ietary iron intake                 

D50.8                                   Active              [id number]

 

                Problem         Migraine without aura and without status migrain

osus, not intractable                 

G43.009                                 Active              [id number]

 

          Problem   Chronic fatigue           R53.82              Active    8422

9001

 

          Problem   Body mass index (BMI) of 34.0-34.9 in adult           Z68.34

              Active    [id number]







ALLERGIES

No Known Allergies



ENCOUNTERS





                Encounter       Location        Date            Diagnosis

 

                          [place] [id number] [place] [address] [id number]

[address] [place] [phone]

                          11 Sep, 2018               

 

                          [place] [id number]

[address] [place] [phone]

                          06 Sep, 2018              Encounter for Depo-Provera c

ontraception Z30.42

 

                          [facility]     [address] [facility] [id number]

[address] [place] [phone]

                                         

 

                          [place] [address] [place] [address] [id number]

[address] [place] [phone]

                                        Right lower quadrant abdomin

al pain R10.31

 

                          [place] [address] [facility] [id number]

[address] [place] [phone]

                          31 May, 2018              Iron deficiency anemia secon

adriano to inadequate dietary iron intake 

D50.8 ; Migraine without aura and without status migrainosus, not intractable 
G43.009 ; Other obesity due to excess calories E66.09 ; Body mass index (BMI) of
34.0-34.9 in adult Z68.34 ; Elevated fasting glucose R73.01 ; Primary insomnia 
F51.01 and Chronic fatigue R53.82

 

                          [place] [address] [address]

                          22 May, 2018              Encounter for Depo-Provera c

ontraception Z30.42

 

                          [place] [address] [phone]

                          20 2018              Encounter for well woman exa

m Z01.419 ; Encounter for Depo-Provera 

contraception Z30.42 ; Obesity (BMI 30.0-34.9) E66.9 and Hyperinsulinemia E16.1

 

                          [address] [address]

                          09 2018              Hyperinsulinemia E16.1

 

                          [place] [address] [phone]

                          08 2018              Hyperinsulinemia E16.1

 

                          [place] [address] [phone]

                          05 2018              Hyperinsulinemia E16.1 ; Zackery

hayes without aura and without status 

migrainosus, not intractable G43.009 ; Obesity (BMI 30.0-34.9) E66.9 and Iron 
deficiency anemia secondary to inadequate dietary iron intake D50.8

 

                          [place] [address] [address]

                          15 Dec, 2017              Hyperinsulinemia E16.1 ; Zackery

hayes without aura and without status 

migrainosus, not intractable G43.009 ; Obesity (BMI 30.0-34.9) E66.9 ; Iron 
deficiency anemia secondary to inadequate dietary iron intake D50.8 ; Oral 
contraceptive pill surveillance Z30.41 and Dysuria R30.0

 

                          [place] [address] [phone]

                          15 Dec, 2017              Abnormal CBC R79.89

 

                          [facility]  [address] [facility] [id number]

[address] [place] [address] 

[phone]                17 2017              Tachycardia R00.0

 

                          [facility]     [address] [phone]

                          17 2017               

 

                          [facility]     [id number] [place] [phone]

                          14 Sep, 2017              Hyperinsulinemia E16.1 ; Obe

sity (BMI 30.0-34.9) E66.9 and Migraine

without aura and without status migrainosus, not intractable G43.009

 

                          [facility]     [id number] [place] [phone]

                          15 Aug, 2017               

 

                          [facility]  [address] 

[phone]                10 Aug, 2017              Acute non-recurrent maxillar

y sinusitis J01.00

 

                          [facility]   [address] [id number]

                                        Dental examination Z01.20

 

                          [facility]   [id number] [place] [id number]

                          31 May, 2017              Dental examination Z01.20

 

                          [place] [address] [place] [id number]

[address] [place] [phone]

                                        Encounter for well woman exa

m with routine gynecological exam 

Z01.419 ; Encounter for screening for malignant neoplasm of cervix Z12.4 ; 
Encounter for screening breast examination Z12.39 ; Screen for STD (sexually 
transmitted disease) Z11.3 and Encounter for prescription of oral contraceptives
Z30.011

 

                          [facility]   [address] [id number] [place] [id number]

                                        Dental examination Z01.20

 

                          [facility]     [address] [address]

                          19 Dec, 2016              Abnormal CBC R79.89

 

                          [facility]     [id number] [place] [phone]

                          12 Dec, 2016              Routine health maintenance Z

00.00 ; History of renal calculi 

Z87.442 ; Obesity (BMI 30.0-34.9) E66.9 and Family history of diabetes mellitus 
Z83.3

 

                          [facility]     [id number]

[address] [place] [phone]

                          08 Dec, 2016              Routine health maintenance Z

00.00 ; History of renal calculi 

Z87.442 ; Obesity (BMI 30.0-34.9) E66.9 and Family history of diabetes mellitus 
Z83.3

 

                          [facility]  [address] [facility] [id number]

[address] [place] [address] 

[phone]                28 Sep, 2016              Irritable bowel syndrome wit

h diarrhea K58.0

 

                          [facility]   [address] [id number]

                          24 Aug, 2016              Dental examination Z01.20

 

                          [facility]   [address] [id number]

                                        Dental caries K02.9

 

                          [facility]   [address] [id number]

                          24 May, 2016              Dental examination Z01.20







IMMUNIZATIONS

No Known Immunizations



SOCIAL HISTORY

Never Assessed



REASON FOR VISIT

Pain (acute) right sided abdomen x 2 days, Does not go away when pressed on. No 
injury noted. Pain started in upper abodmen and that has gone away, but the pain
persists in right lower abd. Last BM last night normal consistency. She has been
nauseated with no vomiting. Pain comes and goes it is a stabbing pain, almost 
takes her breath away. [place]



PLAN OF CARE





                          Activity                  Details

 

                                         

 

                          Follow Up                 prn Reason:f/u abdominal analia

n







VITAL SIGNS





                    Height              61 in               2018

 

                    Weight              180.4 lbs           2018

 

                    Temperature         98.4 degrees Fahrenheit 2018

 

                    Heart Rate          92 bpm              2018

 

                    Respiratory Rate    20                  2018

 

                    BMI                 34.08 kg/m2         2018

 

                    Blood pressure systolic 124 mmHg            2018

 

                    Blood pressure diastolic 78 mmHg             2018







MEDICATIONS





        Medication Instructions Dosage  Frequency Start Date End Date Duration S

nadine

 

        Metformin HCl 500 mg Orally 2 times a day 1 tablet 12h     19 Dec, 2016 

        90 days 

Active







RESULTS





                Name            Result          Date            Reference Range

 

                UA LONG DIP (IN HOUSE)                                  

 

                Lot #           [id number]                           

 

                Exp date        2019                          

 

                Clarity         slightly cloudy                  

 

                Color           dark yellow                      

 

                Odor            None                             

 

                GLU             Negative                         

 

                TRENA             Negative                         

 

                KET             Negative                         

 

                SG              1.025                            

 

                BLO             Trace                            

 

                pH              5.0                              

 

                Protein         Negative                         

 

                URO             0.2                              

 

                NIT             Negative                         

 

                HAYLEY             Trace                            

 

                Lot #                                            

 

                Exp date                                         

 

                CT Scan : Abdomen & Pelvis w/ Contrast                          

        







PROCEDURES





                Procedure       Date Ordered    Result          Body Site

 

                URINALYSIS, AUTO, W/O SCOPE 2018                    







INSTRUCTIONS





MEDICATIONS ADMINISTERED

No Known Medications



MEDICAL (GENERAL) HISTORY





                    Type                Description         Date

 

                    Medical History     ERCP                 

 

                    Medical History     Renal calculi        

 

                    Medical History     Migraines            

 

                    Surgical History    kidney stone removed  

 

                    Hospitalization History kidney stones        

 

                          Hospitalization History   ER visit- itching all over b

madelin, blurry vision, SOB dxd 

migraine                                2018

## 2020-07-09 NOTE — XMS REPORT
[facility]

                             Created on: 2018



Saida Fermin

External Reference #: [id number]

: 1985

Sex: Female



Demographics





                          Address                   [address] [place]  [address]

 

                          Preferred Language        Unknown

 

                          Marital Status            Unknown

 

                          Yazidism Affiliation     Unknown

 

                          Race                      Unknown

 

                          Ethnic Group              Unknown





Author





                          Author                    Saida RAUSCH

 

                          Organization              [facility] [place]

 

                          Address                   [address] [place]  [address]



 

                          Phone                     [phone]







Care Team Providers





                    Care Team Member Name Role                Phone

 

                    SHALOMCHETELE     Unavailable         [phone]







PROBLEMS





          Type      Condition ICD9-CM Code LTD07-ZO Code Onset Dates Condition S

tatus SNOMED 

Code

 

          Problem   Obesity (BMI 30.0-34.9)           E66.9               Active

    [id number]

 

          Problem   Abnormal CBC measurement           R79.89              Activ

e    [id number]

 

          Problem   Other obesity due to excess calories           E66.09       

       Active    610307845

 

          Problem   Primary insomnia           F51.01              Active    397

2004

 

                Problem         Iron deficiency anemia secondary to inadequate d

ietary iron intake                 

D50.8                                   Active              [id number]

 

                Problem         Migraine without aura and without status migrain

osus, not intractable                 

G43.009                                 Active              [id number]

 

          Problem   Chronic fatigue           R53.82              Active    8422

9001

 

          Problem   Body mass index (BMI) of 34.0-34.9 in adult           Z68.34

              Active    [id number]







ALLERGIES

No Known Allergies



ENCOUNTERS





                Encounter       Location        Date            Diagnosis

 

                          [place] [address] [facility] [id number]

[address] [place] [address]

                          11 Sep, 2018               

 

                          [place] [address] [facility] [id number]

[address] [place] [address]

                                         

 

                          [facility] [place]     [address] [facility] [id number]

[address] [place] [address]

                                        Right lower quadrant abdomin

al pain R10.31

 

                          [place] [address] [facility] [id number]

[address] [place] [address]

                          31 May, 2018              Iron deficiency anemia secon

adriano to inadequate dietary iron intake 

D50.8 ; Migraine without aura and without status migrainosus, not intractable 
G43.009 ; Other obesity due to excess calories E66.09 ; Body mass index (BMI) of
34.0-34.9 in adult Z68.34 ; Elevated fasting glucose R73.01 ; Primary insomnia 
F51.01 and Chronic fatigue R53.82

 

                          [facility] [place]     [address] [address]

                          22 May, 2018              Encounter for Depo-Provera c

ontraception Z30.42

 

                          [place] [address] [address]

                                        Encounter for well woman exa

m Z01.419 ; Encounter for Depo-Provera 

contraception Z30.42 ; Obesity (BMI 30.0-34.9) E66.9 and Hyperinsulinemia E16.1

 

                          [address] [address]

                          09 2018              Hyperinsulinemia E16.1

 

                          [place] [address] [address]

                                        Hyperinsulinemia E16.1

 

                          [place] [address] [address]

                                        Hyperinsulinemia E16.1 ; Zackery

hayes without aura and without status 

migrainosus, not intractable G43.009 ; Obesity (BMI 30.0-34.9) E66.9 and Iron 
deficiency anemia secondary to inadequate dietary iron intake D50.8

 

                          [place] [address] [address]

                          15 Dec, 2017              Hyperinsulinemia E16.1 ; Zackery

hayes without aura and without status 

migrainosus, not intractable G43.009 ; Obesity (BMI 30.0-34.9) E66.9 ; Iron 
deficiency anemia secondary to inadequate dietary iron intake D50.8 ; Oral 
contraceptive pill surveillance Z30.41 and Dysuria R30.0

 

                          [place] [address] [address]

                          15 Dec, 2017              Abnormal CBC R79.89

 

                          [facility]  [address] 

[address]                              Tachycardia R00.0

 

                          [place] [address]

[address] [place] [address]

                          17 2017               

 

                          [facility] [place]     [address] [address]

                          14 Sep, 2017              Hyperinsulinemia E16.1 ; Obe

sity (BMI 30.0-34.9) E66.9 and Migraine

without aura and without status migrainosus, not intractable G43.009

 

                          [facility] [place]     [address] [address]

                          15 Aug, 2017               

 

                          [facility] [place] [facility]  [address] 

[address]                10 Aug, 2017              Acute non-recurrent maxillar

y sinusitis J01.00

 

                          [facility]   [address] [id number]

                                        Dental examination Z01.20

 

                          [facility]   [address] [id number]

                          31 May, 2017              Dental examination Z01.20

 

                          [place] [address] [address]

                          03 2017              Encounter for well woman exa

m with routine gynecological exam 

Z01.419 ; Encounter for screening for malignant neoplasm of cervix Z12.4 ; 
Encounter for screening breast examination Z12.39 ; Screen for STD (sexually 
transmitted disease) Z11.3 and Encounter for prescription of oral contraceptives
Z30.011

 

                          [facility]   [address] [id number]

                                        Dental examination Z01.20

 

                          [place] [address] [address]

                          19 Dec, 2016              Abnormal CBC R79.89

 

                          [address] [address]

                          12 Dec, 2016              Routine health maintenance Z

00.00 ; History of renal calculi 

Z87.442 ; Obesity (BMI 30.0-34.9) E66.9 and Family history of diabetes mellitus 
Z83.3

 

                          [place] [address] [place] [id number]

[address] [address]

                          08 Dec, 2016              Routine health maintenance Z

00.00 ; History of renal calculi 

Z87.442 ; Obesity (BMI 30.0-34.9) E66.9 and Family history of diabetes mellitus 
Z83.3

 

                          [facility]  [address] [facility] [id number]

[address], [place] 

[address]                28 Sep, 2016              Irritable bowel syndrome wit

h diarrhea K58.0

 

                          [facility]   [address] [facility] [id number] [place] [id number]

                          24 Aug, 2016              Dental examination Z01.20

 

                          [facility]   [address] [id number]

                                        Dental caries K02.9

 

                          [facility]   [address] [place] [id number] [place] [id number]

                          24 May, 2016              Dental examination Z01.20







IMMUNIZATIONS

No Known Immunizations



SOCIAL HISTORY

Never Assessed



REASON FOR VISIT

Diabetes fu -- rochelle david, patient state she is worried because she gained mor
e weight with the Depo injection , states she has a lump on left side of her nec
k by left  ear 



PLAN OF CARE





                          Activity                  Details

 

                                         

 

                          Follow Up                 3 Months, prn Reason:CHM/Ane

nadir







VITAL SIGNS





                    Height              61 in               2018

 

                    Weight              181.0 lbs           2018

 

                    Temperature         97.8 degrees Fahrenheit 2018

 

                    Heart Rate          76 bpm              2018

 

                    Respiratory Rate    18                  2018

 

                    BMI                 34.20 kg/m2         2018

 

                    Blood pressure systolic 110 mmHg            2018

 

                    Blood pressure diastolic 78 mmHg             2018







MEDICATIONS





        Medication Instructions Dosage  Frequency Start Date End Date Duration S

tatus

 

        Iron (Ferrous Sulfate) 142 (45 Fe) MG Orally Once a day 1 tablet 24h    

                         

Active

 

        Metformin HCl 500 mg Orally 2 times a day 1 tablet 12h     19 Dec, 2016 

        90 days 

Active

 

             Lunesta 2 MG Orally Once a day PRN 1 tablet immediately before bedt

eric              31 May,

2018                                    30 days             Active







RESULTS





                Name            Result          Date            Reference Range

 

                A1C (IN HOUSE)                  2018       

 

                A1C IN HOUSE    5.4                             4.3 - 5.6 %

 

                Previous A1c    5.3                              

 

                Lot             [id number]                             

 

                Exp date        2020                          







PROCEDURES





                Procedure       Date Ordered    Result          Body Site

 

                GLYCATED HEMOGLOBIN TEST May 31, 2018                     







INSTRUCTIONS





MEDICATIONS ADMINISTERED

No Known Medications



MEDICAL (GENERAL) HISTORY





                    Type                Description         Date

 

                    Medical History     ERCP                 

 

                    Medical History     Renal calculi        

 

                    Medical History     Migraines            

 

                    Surgical History    kidney stone removed  

 

                    Hospitalization History kidney stones        

 

                          Hospitalization History   ER visit- itching all over b

madelin, blurry vision, SOB dxd 

migraine                                2018

## 2020-07-09 NOTE — XMS REPORT
[facility]

                             Created on: 2018



Saida Fermin

External Reference #: [id number]

: 1985

Sex: Female



Demographics





                          Address                   [address] [place]  [address]

 

                          Preferred Language        Unknown

 

                          Marital Status            Unknown

 

                          Advent Affiliation     Unknown

 

                          Race                      Unknown

 

                          Ethnic Group              Unknown





Author





                          Author                    Saida RAUSCH

 

                          Organization              [facility]

 

                          Address                   [address] [place]  [address]



 

                          Phone                     [phone]







Care Team Providers





                    Care Team Member Name Role                Phone

 

                    SHALOMCHETELE     Unavailable         [phone]







PROBLEMS





          Type      Condition ICD9-CM Code TDE43-JF Code Onset Dates Condition S

tatus SNOMED 

Code

 

          Problem   Obesity (BMI 30.0-34.9)           E66.9               Active

    [id number]

 

          Problem   Abnormal CBC measurement           R79.89              Activ

e    105677660

 

          Problem   Other obesity due to excess calories           E66.09       

       Active    721288220

 

          Problem   Primary insomnia           F51.01              Active    397

2004

 

                Problem         Iron deficiency anemia secondary to inadequate d

ietary iron intake                 

D50.8                                   Active              590258331

 

                Problem         Migraine without aura and without status migrain

osus, not intractable                 

G43.009                                 Active              629641657

 

          Problem   Chronic fatigue           R53.82              Active    8422

9001

 

          Problem   Body mass index (BMI) of 34.0-34.9 in adult           Z68.34

              Active    554758817







ALLERGIES

No Information



ENCOUNTERS





                Encounter       Location        Date            Diagnosis

 

                          [place] [address] [place] [id number]

[address] [place] [address]

                          11 Sep, 2018              Pre-diabetes R73.03 ; Iron d

eficiency anemia secondary to 

inadequate dietary iron intake D50.8 ; Chronic fatigue R53.82 and Obesity (BMI 
30.0-34.9) E66.9

 

                          [facility]     [address] [facility] [id number]

[address] [place] [address]

                          06 Sep, 2018              Encounter for Depo-Provera c

ontraception Z30.42

 

                          [facility]     [address] [facility] [id number]

[address] [place] [address]

                                         

 

                          [place] [address] [facility] [id number]

[address] [place] [address]

                                        Right lower quadrant abdomin

al pain R10.31

 

                          [place] [address] [place] [id number]

[address] [place] [address]

                          31 May, 2018              Iron deficiency anemia secon

adriano to inadequate dietary iron intake 

D50.8 ; Migraine without aura and without status migrainosus, not intractable 
G43.009 ; Other obesity due to excess calories E66.09 ; Body mass index (BMI) of
34.0-34.9 in adult Z68.34 ; Elevated fasting glucose R73.01 ; Primary insomnia 
F51.01 and Chronic fatigue R53.82

 

                          [place] [address] [address]

                          22 May, 2018              Encounter for Depo-Provera c

ontraception Z30.42

 

                          [address] [address]

                          20 2018              Encounter for well woman exa

m Z01.419 ; Encounter for Depo-Provera 

contraception Z30.42 ; Obesity (BMI 30.0-34.9) E66.9 and Hyperinsulinemia E16.1

 

                          [place] [address] [address]

                          09 2018              Hyperinsulinemia E16.1

 

                          [address] [address]

                          08 2018              Hyperinsulinemia E16.1

 

                          [place] [address] [address]

                          05 2018              Hyperinsulinemia E16.1 ; Zackery

hayes without aura and without status 

migrainosus, not intractable G43.009 ; Obesity (BMI 30.0-34.9) E66.9 and Iron 
deficiency anemia secondary to inadequate dietary iron intake D50.8

 

                          [address] [address]

                          15 Dec, 2017              Hyperinsulinemia E16.1 ; Zackery

hayes without aura and without status 

migrainosus, not intractable G43.009 ; Obesity (BMI 30.0-34.9) E66.9 ; Iron 
deficiency anemia secondary to inadequate dietary iron intake D50.8 ; Oral 
contraceptive pill surveillance Z30.41 and Dysuria R30.0

 

                          [facility]     [address] [address]

                          15 Dec, 2017              Abnormal CBC R79.89

 

                          [facility]  [address] 

[address]                              Tachycardia R00.0

 

                          [facility]     [address] [address]

                                         

 

                          [place] [address] [address]

                          14 Sep, 2017              Hyperinsulinemia E16.1 ; Obe

sity (BMI 30.0-34.9) E66.9 and Migraine

without aura and without status migrainosus, not intractable G43.009

 

                          [place] [address] [address]

                          15 Aug, 2017               

 

                          [facility] [place] [facility]  [address] 

[address]                10 Aug, 2017              Acute non-recurrent maxillar

y sinusitis J01.00

 

                          [facility]   [address] [id number]

                                        Dental examination Z01.20

 

                          [facility]   [address] [place] [id number] [place] [id number]

                          31 May, 2017              Dental examination Z01.20

 

                          [facility]     [address] [address]

                          03 2017              Encounter for well woman exa

m with routine gynecological exam 

Z01.419 ; Encounter for screening for malignant neoplasm of cervix Z12.4 ; 
Encounter for screening breast examination Z12.39 ; Screen for STD (sexually 
transmitted disease) Z11.3 and Encounter for prescription of oral contraceptives
Z30.011

 

                          [facility]   [address] [place] [id number] [place] [id number]

                                        Dental examination Z01.20

 

                          [facility]     [address] [address]

                          19 Dec, 2016              Abnormal CBC R79.89

 

                          [facility]     [address] [facility] [id number]

[address] [place] [address]

                          12 Dec, 2016              Routine health maintenance Z

00.00 ; History of renal calculi 

Z87.442 ; Obesity (BMI 30.0-34.9) E66.9 and Family history of diabetes mellitus 
Z83.3

 

                          [facility]     [address] [facility] [id number]

[address] [place] [address]

                          08 Dec, 2016              Routine health maintenance Z

00.00 ; History of renal calculi 

Z87.442 ; Obesity (BMI 30.0-34.9) E66.9 and Family history of diabetes mellitus 
Z83.3

 

                          [facility]  [address] [place] [id number]

[address] [place] [address] 

[address]                28 Sep, 2016              Irritable bowel syndrome wit

h diarrhea K58.0

 

                          [facility]   [address] [place] [id number]

                          24 Aug, 2016              Dental examination Z01.20

 

                          [facility]   [address] [place] [id number] [place] [id number]

                                        Dental caries K02.9

 

                          [facility]   [address] [id number]

                          24 May, 2016              Dental examination Z01.20







IMMUNIZATIONS





                Vaccine         Route           Administration Date Status

 

                DEPO PROVERA (150 MG/ML) IM Intramuscular 2018   Admini

stered







SOCIAL HISTORY

Never Assessed



REASON FOR VISIT

Depo Provera injection- JOHNNY Rivera RN



PLAN OF CARE





VITAL SIGNS





MEDICATIONS

No Known Medications



RESULTS





                Name            Result          Date            Reference Range

 

                PREGNANCY TEST, URINE (IN HOUSE)                                

  

 

                RESULTS         negative                         

 

                Lot #           [id number]                          

 

                Control         +                                

 

                Exp date        3/2020                           







PROCEDURES





                Procedure       Date Ordered    Result          Body Site

 

                URINE PREGNANCY TEST 2018                    

 

                DEPO PROVERA (150 MG/ML) 2018                    

 

                THER/PROPH/DIAG INJ, SC/IM 2018                    







INSTRUCTIONS





MEDICATIONS ADMINISTERED

No Known Medications



MEDICAL (GENERAL) HISTORY





                    Type                Description         Date

 

                    Medical History     ERCP                 

 

                    Medical History     Renal calculi        

 

                    Medical History     Migraines            

 

                    Surgical History    kidney stone removed  

 

                    Hospitalization History kidney stones        

 

                          Hospitalization History   ER visit- itching all over b

madelin, blurry vision, SOB dxd 

migraine                                2018

## 2020-07-09 NOTE — XMS REPORT
[facility]

                             Created on: 2018



Saida Fermin

External Reference #: [id number]

: 1985

Sex: Female



Demographics





                          Address                   [address] [place]  [address]

 

                          Preferred Language        Unknown

 

                          Marital Status            Unknown

 

                          Worship Affiliation     Unknown

 

                          Race                      Unknown

 

                          Ethnic Group              Unknown





Author





                          Author                    Saida RAUSCH

 

                          Organization              [facility]

 

                          Address                   [address] [place]  [address]



 

                          Phone                     [phone]







Care Team Providers





                    Care Team Member Name Role                Phone

 

                    SHALOMCHETELE     Unavailable         [phone]







PROBLEMS





          Type      Condition ICD9-CM Code YEX41-TJ Code Onset Dates Condition S

tatus SNOMED 

Code

 

                Problem         Iron deficiency anemia secondary to inadequate d

ietary iron intake                 

D50.8                                   Active              [id number]

 

                Problem         Migraine without aura and without status migrain

osus, not intractable                 

G43.009                                 Active              [id number]

 

          Problem   Abnormal CBC measurement           R79.89              Activ

e    [id number]

 

          Problem   Obesity (BMI 30.0-34.9)           E66.9               Active

    [id number]

 

          Problem   Hyperinsulinemia           E16.1               Active    834

[id number]







ALLERGIES

No Known Allergies



ENCOUNTERS





                Encounter       Location        Date            Diagnosis

 

                          [place] [address] [address]

                          31 May, 2018               

 

                          [place] [address] [address]

                          20 2018              Encounter for well woman exa

m Z01.419 ; Encounter for Depo-Provera 

contraception Z30.42 ; Obesity (BMI 30.0-34.9) E66.9 and Hyperinsulinemia E16.1

 

                          [place] [address] [address]

                                        Hyperinsulinemia E16.1

 

                          [place] [address] [address]

                                        Hyperinsulinemia E16.1

 

                          [place] [address] [address]

                                        Hyperinsulinemia E16.1 ; Zackery

hayes without aura and without status 

migrainosus, not intractable G43.009 ; Obesity (BMI 30.0-34.9) E66.9 and Iron 
deficiency anemia secondary to inadequate dietary iron intake D50.8

 

                          [facility]     [address] [address]

                          15 Dec, 2017              Hyperinsulinemia E16.1 ; Zackery

hayes without aura and without status 

migrainosus, not intractable G43.009 ; Obesity (BMI 30.0-34.9) E66.9 ; Iron 
deficiency anemia secondary to inadequate dietary iron intake D50.8 ; Oral 
contraceptive pill surveillance Z30.41 and Dysuria R30.0

 

                          [place] [address] [address]

                          15 Dec, 2017              Abnormal CBC R79.89

 

                          [facility] [place] [address] 

[address]                              Tachycardia R00.0

 

                          [place] [address] [address]

                                         

 

                          [place] [address] [address]

                          14 Sep, 2017              Hyperinsulinemia E16.1 ; Obe

sity (BMI 30.0-34.9) E66.9 and Migraine

without aura and without status migrainosus, not intractable G43.009

 

                          [place] [address] [address]

                          15 Aug, 2017               

 

                          [facility] [place] [facility]  [address] 

[address]                10 Aug, 2017              Acute non-recurrent maxillar

y sinusitis J01.00

 

                          [facility]   [address] [facility] [id number]

[address] [place] [id number]

                                        Dental examination Z01.20

 

                          [facility]   [address] [id number]

                          31 May, 2017              Dental examination Z01.20

 

                          [place] [address] [address]

                                        Encounter for well woman exa

m with routine gynecological exam 

Z01.419 ; Encounter for screening for malignant neoplasm of cervix Z12.4 ; 
Encounter for screening breast examination Z12.39 ; Screen for STD (sexually 
transmitted disease) Z11.3 and Encounter for prescription of oral contraceptives
Z30.011

 

                          [facility]   [address] [facility] [id number]

[address] [place] [id number]

                                        Dental examination Z01.20

 

                          [facility]     [address] [place] [id number]

[address] [place] [address]

                          19 Dec, 2016              Abnormal CBC R79.89

 

                          [facility]     [address] [address]

                          12 Dec, 2016              Routine health maintenance Z

00.00 ; History of renal calculi 

Z87.442 ; Obesity (BMI 30.0-34.9) E66.9 and Family history of diabetes mellitus 
Z83.3

 

                          [facility]     [address] [place] [id number]

[address] [place] [address]

                          08 Dec, 2016              Routine health maintenance Z

00.00 ; History of renal calculi 

Z87.442 ; Obesity (BMI 30.0-34.9) E66.9 and Family history of diabetes mellitus 
Z83.3

 

                          [facility]  [address] [place] [id number]

[address] [place] [address] 

[address]                28 Sep, 2016              Irritable bowel syndrome wit

h diarrhea K58.0

 

                          [facility]   [address] [facility] [id number]

[address] [place] [id number]

                          24 Aug, 2016              Dental examination Z01.20

 

                          [facility]   [address] [facility] [id number]

[address] [place] [id number]

                                        Dental caries K02.9

 

                          [facility]   [address] [place] [id number]

[address] [place] [id number]

                          24 May, 2016              Dental examination Z01.20







IMMUNIZATIONS

No Known Immunizations



SOCIAL HISTORY

Never Assessed



REASON FOR VISIT

Diabetes----DBennettRN



PLAN OF CARE





                          Activity                  Details

 

                                         

 

                          Follow Up                 3 Months Reason:







VITAL SIGNS





                    Height              61 in               2017

 

                    Weight              175 lbs             2017

 

                    Temperature         98.3 degrees Fahrenheit 2017

 

                    Heart Rate          80 bpm              2017

 

                    Respiratory Rate    20                  2017

 

                    BMI                 33.06 kg/m2         2017

 

                    Blood pressure systolic 120 mmHg            2017

 

                    Blood pressure diastolic 60 mmHg             2017







MEDICATIONS





        Medication Instructions Dosage  Frequency Start Date End Date Duration S

tatus

 

          Mononessa 0.25-35 MG-MCG Orally Once a day 1 tablet  24h                  28 day(s)

                                        Active

 

                    Butalbital-APAP-Caffeine -40 MG Orally 3 times a day a

s needed 1 capsule 

as needed                 14 Sep, 2017 14 Oct, 2017 30 days      Active

 

        Metformin HCl 500 mg Orally 2 times a day 1 tablet 12h     19 Dec, 2016 

        90 days 

Active







RESULTS

No Results



PROCEDURES

No Known procedures



INSTRUCTIONS





MEDICATIONS ADMINISTERED

No Known Medications



MEDICAL (GENERAL) HISTORY





                    Type                Description         Date

 

                    Medical History     ERCP                 

 

                    Medical History     Renal calculi        

 

                    Medical History     Migraines            

 

                    Surgical History    kidney stone removed  

 

                    Hospitalization History kidney stones        

 

                          Hospitalization History   ER visit- itching all over b

madelin, blurry vision, SOB dxd 

migraine                                2018

## 2020-07-09 NOTE — XMS REPORT
[facility]

                             Created on: 2017



Saida Fermin

External Reference #: [id number]

: 1985

Sex: Female



Demographics





                          Address                   [address] [place]  [address]

 

                          Preferred Language        Unknown

 

                          Marital Status            Unknown

 

                          [place] Affiliation     Unknown

 

                          Race                      Unknown

 

                          Ethnic Group              Unknown





Author





                          Author                    Saida VELAZQUEZ

 

                          Organization              [facility]

 

                          Address                   Unknown

 

                          Phone                     [phone]







Care Team Providers





                    Care Team Member Name Role                Phone

 

                    HANNA VELAZQUEZ     Unavailable         [phone]







PROBLEMS





          Type      Condition ICD9-CM Code ZII79-UV Code Onset Dates Condition S

tatus SNOMED 

Code

 

          Problem   Pre-diabetes           R73.03              Active    [id number]

02

 

          Problem   Abnormal CBC measurement           R79.89              Activ

e    [id number]

 

          Problem   Family history of diabetes mellitus           Z83.3         

      Active    [id number]

 

          Problem   Obesity (BMI 30.0-34.9)           E66.9               Active

    [id number]

 

          Problem   Dental examination           Z01.20              Active    1

[id number]

 

          Problem   Hyperinsulinemia           E16.1               Active    834

[address]

 

          Problem   History of renal calculi           Z87.442             Activ

e    [id number]

 

          Problem   Routine health maintenance           Z00.00              Act

nicole    [id number]







ALLERGIES

Unknown Allergies



SOCIAL HISTORY

No smoking Hx information available



PLAN OF CARE





VITAL SIGNS





MEDICATIONS

Unknown Medications



RESULTS

No Results



PROCEDURES





                Procedure       Date Ordered    Related Diagnosis Body Site

 

                Billing Notes on claim 2017                     







IMMUNIZATIONS

No Known Immunizations

## 2020-07-10 VITALS — DIASTOLIC BLOOD PRESSURE: 83 MMHG | SYSTOLIC BLOOD PRESSURE: 118 MMHG

## 2020-07-10 VITALS — DIASTOLIC BLOOD PRESSURE: 71 MMHG | SYSTOLIC BLOOD PRESSURE: 114 MMHG

## 2020-07-10 VITALS — DIASTOLIC BLOOD PRESSURE: 70 MMHG | SYSTOLIC BLOOD PRESSURE: 118 MMHG

## 2020-07-10 VITALS — DIASTOLIC BLOOD PRESSURE: 82 MMHG | SYSTOLIC BLOOD PRESSURE: 135 MMHG

## 2020-07-10 VITALS — DIASTOLIC BLOOD PRESSURE: 68 MMHG | SYSTOLIC BLOOD PRESSURE: 112 MMHG

## 2020-07-10 LAB
ALBUMIN SERPL-MCNC: 3.7 GM/DL (ref 3.2–4.5)
ALP SERPL-CCNC: 80 U/L (ref 40–136)
ALT SERPL-CCNC: 25 U/L (ref 0–55)
BASOPHILS # BLD AUTO: 0 10^3/UL (ref 0–0.1)
BASOPHILS NFR BLD AUTO: 0 % (ref 0–10)
BILIRUB SERPL-MCNC: 0.3 MG/DL (ref 0.1–1)
BUN/CREAT SERPL: 7
CALCIUM SERPL-MCNC: 8.6 MG/DL (ref 8.5–10.1)
CHLORIDE SERPL-SCNC: 109 MMOL/L (ref 98–107)
CO2 SERPL-SCNC: 20 MMOL/L (ref 21–32)
CREAT SERPL-MCNC: 0.6 MG/DL (ref 0.6–1.3)
EOSINOPHIL # BLD AUTO: 0 10^3/UL (ref 0–0.3)
EOSINOPHIL NFR BLD AUTO: 0 % (ref 0–10)
ERYTHROCYTE [DISTWIDTH] IN BLOOD BY AUTOMATED COUNT: 14.6 % (ref 10–14.5)
GFR SERPLBLD BASED ON 1.73 SQ M-ARVRAT: > 60 ML/MIN
GLUCOSE SERPL-MCNC: 147 MG/DL (ref 70–105)
HCT VFR BLD CALC: 32 % (ref 35–52)
HGB BLD-MCNC: 10.8 G/DL (ref 11.5–16)
LYMPHOCYTES # BLD AUTO: 1.1 X 10^3 (ref 1–4)
LYMPHOCYTES NFR BLD AUTO: 20 % (ref 12–44)
MAGNESIUM SERPL-MCNC: 2 MG/DL (ref 1.6–2.4)
MANUAL DIFFERENTIAL PERFORMED BLD QL: NO
MCH RBC QN AUTO: 27 PG (ref 25–34)
MCHC RBC AUTO-ENTMCNC: 33 G/DL (ref 32–36)
MCV RBC AUTO: 81 FL (ref 80–99)
MONOCYTES # BLD AUTO: 0.3 X 10^3 (ref 0–1)
MONOCYTES NFR BLD AUTO: 5 % (ref 0–12)
NEUTROPHILS # BLD AUTO: 4 X 10^3 (ref 1.8–7.8)
NEUTROPHILS NFR BLD AUTO: 75 % (ref 42–75)
PLATELET # BLD: 230 10^3/UL (ref 130–400)
PMV BLD AUTO: 10.6 FL (ref 7.4–10.4)
POTASSIUM SERPL-SCNC: 3 MMOL/L (ref 3.6–5)
PROT SERPL-MCNC: 7.3 GM/DL (ref 6.4–8.2)
SODIUM SERPL-SCNC: 140 MMOL/L (ref 135–145)
WBC # BLD AUTO: 5.4 10^3/UL (ref 4.3–11)

## 2020-07-10 RX ADMIN — ALBUTEROL SULFATE SCH GM: 90 AEROSOL, METERED RESPIRATORY (INHALATION) at 06:30

## 2020-07-10 RX ADMIN — ALBUTEROL SULFATE SCH GM: 90 AEROSOL, METERED RESPIRATORY (INHALATION) at 09:15

## 2020-07-10 RX ADMIN — ACETAMINOPHEN PRN MG: 500 TABLET ORAL at 11:49

## 2020-07-10 RX ADMIN — ONDANSETRON SCH MG: 2 INJECTION, SOLUTION INTRAMUSCULAR; INTRAVENOUS at 11:48

## 2020-07-10 RX ADMIN — POTASSIUM CHLORIDE SCH MEQ: 1500 TABLET, EXTENDED RELEASE ORAL at 18:18

## 2020-07-10 RX ADMIN — INSULIN ASPART SCH UNIT: 100 INJECTION, SOLUTION INTRAVENOUS; SUBCUTANEOUS at 16:59

## 2020-07-10 RX ADMIN — INSULIN ASPART SCH UNIT: 100 INJECTION, SOLUTION INTRAVENOUS; SUBCUTANEOUS at 11:47

## 2020-07-10 RX ADMIN — MORPHINE SULFATE PRN MG: 4 INJECTION, SOLUTION INTRAMUSCULAR; INTRAVENOUS at 08:22

## 2020-07-10 RX ADMIN — POTASSIUM CHLORIDE SCH MLS/HR: 200 INJECTION, SOLUTION INTRAVENOUS at 17:53

## 2020-07-10 RX ADMIN — ENOXAPARIN SODIUM SCH MG: 100 INJECTION SUBCUTANEOUS at 11:48

## 2020-07-10 RX ADMIN — ALBUTEROL SULFATE SCH GM: 90 AEROSOL, METERED RESPIRATORY (INHALATION) at 04:25

## 2020-07-10 RX ADMIN — Medication SCH ML: at 14:57

## 2020-07-10 RX ADMIN — ALBUTEROL SULFATE SCH PUFF: 90 AEROSOL, METERED RESPIRATORY (INHALATION) at 18:18

## 2020-07-10 RX ADMIN — ALBUTEROL SULFATE SCH GM: 90 AEROSOL, METERED RESPIRATORY (INHALATION) at 02:30

## 2020-07-10 RX ADMIN — ACETAMINOPHEN PRN MG: 500 TABLET ORAL at 21:36

## 2020-07-10 RX ADMIN — INSULIN ASPART SCH UNIT: 100 INJECTION, SOLUTION INTRAVENOUS; SUBCUTANEOUS at 21:36

## 2020-07-10 RX ADMIN — INSULIN ASPART SCH UNIT: 100 INJECTION, SOLUTION INTRAVENOUS; SUBCUTANEOUS at 04:26

## 2020-07-10 RX ADMIN — ALBUTEROL SULFATE SCH PUFF: 90 AEROSOL, METERED RESPIRATORY (INHALATION) at 21:37

## 2020-07-10 RX ADMIN — POTASSIUM CHLORIDE SCH MEQ: 1500 TABLET, EXTENDED RELEASE ORAL at 21:34

## 2020-07-10 RX ADMIN — ACETAMINOPHEN PRN MG: 500 TABLET ORAL at 04:24

## 2020-07-10 RX ADMIN — ALBUTEROL SULFATE SCH PUFF: 90 AEROSOL, METERED RESPIRATORY (INHALATION) at 14:55

## 2020-07-10 RX ADMIN — Medication SCH ML: at 04:26

## 2020-07-10 RX ADMIN — Medication SCH ML: at 21:37

## 2020-07-10 RX ADMIN — ONDANSETRON PRN MG: 2 INJECTION, SOLUTION INTRAMUSCULAR; INTRAVENOUS at 12:00

## 2020-07-10 RX ADMIN — POTASSIUM CHLORIDE SCH MEQ: 1500 TABLET, EXTENDED RELEASE ORAL at 17:53

## 2020-07-10 NOTE — NUR
DR BRADSHAW NOTIFIED PT OF POSITIVE COVID RESULTS. PT MOVED FROM PUI ROOM TO [facility].

## 2020-07-10 NOTE — NUR
PT INDICATED TO THE DR THAT SHE JUST TAKES METFORMIN. I CALLED [facility] AND THEY FILLED 
METFORMIN 500MG ON 06- #60/30DS. I HAVE ENTERED THIS ON THE MED REC

## 2020-07-10 NOTE — HISTORY & PHYSICAL-HOSPITALIST
History of Present Illness


HPI/Chief Complaint


Saida Catalan is a 34-year-old female with past medical history of 

prediabetes and obesity who presented with shortness of breath.  She says that 

her symptoms started on .  She reports having fevers.  She reports a dry 

cough.  She reports having some pleuritic chest pain.  She denies any abdominal 

pain, nausea, vomiting, or diarrhea.  She denies tobacco use.  Her only 

medication is metformin.


Source:  patient


Exam Limitations:  no limitations


Date Seen


7/10/20


Time Seen by a Provider:  09:45


Attending Physician


Valerie Bradshaw MD


PCP


[facility]


Referring Physician





Date of Admission


2020 at 12:39





Home Medications & Allergies


Home Medications


Reviewed patient Home Medication Reconciliation performed by pharmacy medication

reconciliations technician and/or nursing.


Patients Allergies have been reviewed.





Allergies





Allergies


Coded Allergies


  NKANo Known Allergies (Verified Allergy, Unknown, 06)








Past Medical-Social-Family Hx


Past Med/Social Hx:  Reviewed Nursing Past Med/Soc Hx


Patient Social History


Alcohol Use:  Denies Use


Recreational Drug Use:  No


Smoking Status:  Never a Smoker


Recent Foreign Travel:  No


Contact w/other who traveled:  No


Recent Hopitalizations:  Yes


Recent Infectious Disease Expo:  No





Immunizations Up To Date


Tetanus Booster (TDap):  Less than 5yrs


Date of Influenza Vaccine:  Dec 5, 2013





Past Medical History


Surgeries:  Appendectomy


Reproductive:  No


Sexually Transmitted Disease:  No


HIV/AIDS:  No


Female Reproductive Disorders:  Denies


Genitourinary:  Kidney Infection, Kidney Stones


Endocrine:  Diabetes, Non-Insulin dep


Loss of Vision:  Denies


Hearing Impairment:  Denies


History of Blood Disorders:  Yes (ANEMIA WITH Previous pregnancy)


Adverse Reaction to Blood Chaney:  No





Family History


Reviewed Nursing Family Hx





Diabetes mellitus


No Pertinent Family Hx





Review of Systems


Constitutional:  no symptoms reported


EENTM:  no symptoms reported


Respiratory:  no symptoms reported


Cardiovascular:  no symptoms reported


Gastrointestinal:  no symptoms reported


Genitourinary:  no symptoms reported


Musculoskeletal:  no symptoms reported


Skin:  no symptoms reported


Psychiatric/Neurological:  No Symptoms Reported





Physical Exam


Physical Exam


Vital Signs





Vital Signs - First Documented








 20





 11:30 15:45 18:47


 


Temp 37.8  


 


Pulse 111  


 


Resp 16  


 


B/P (MAP) 134/84 (101)  


 


Pulse Ox 95  


 


O2 Delivery Room Air  


 


O2 Flow Rate   2.00


 


FiO2  21 





Capillary Refill : Less Than 3 SecondsLess Than 3 Seconds


Height, Weight, BMI


Height: 5'1.00"


Weight: 180lbs. 0.0oz. 81.904506jk; 34.74 BMI


Method:Stated


General Appearance:  No Apparent Distress


HEENT:  PERRL/EOMI, Pharynx Normal


Neck:  Normal Inspection, Supple


Respiratory:  Lungs Clear, Normal Breath Sounds, Other (Tachypnea)


Cardiovascular:  Regular Rate, Rhythm, No Edema, No Murmur


Gastrointestinal:  Normal Bowel Sounds, Non Tender, Soft


Extremity:  Normal Inspection, Non Tender, No Pedal Edema


Neurologic/Psychiatric:  Alert, Oriented x3, No Motor/Sensory Deficits, Normal 

Mood/Affect


Skin:  Normal Color, Diaphoresis





Results


Results/Procedures


Labs


Laboratory Tests


20 11:40








Patient resulted labs reviewed.


Imaging:  Reviewed Imaging Report





Assessment/Plan


Admission Diagnosis


Acute respiratory failure with hypoxia


Admission Status:  Inpatient Order (span 2 midnights)


Reason for Inpatient Admission:  


Bilateral pneumonia


COVID PUI





Assessment and Plan


COVID-19


Acute respiratory failure with hypoxia


Bilateral pneumonia


Tachypnea and hypoxia


Chest x-ray with bilateral infiltrates


COVID PCR positive


Begin Remdesivir


Start Decadron


Procalcitonin normal, discontinue antibiotics





Hypokalemia


Continue to monitor and replace as needed





Prediabetes


Sliding scale insulin





Obesity


Clinically significant, no acute management needs





DVT prophylaxis: Lovenox





Diagnosis/Problems


Diagnosis/Problems





(1) COVID-19


Status:  Acute


(2) Acute respiratory failure with hypoxia


Status:  Acute


(3) Prediabetes


Status:  Chronic


(4) Obesity


Status:  Chronic


(5) Bilateral pneumonia


Status:  Acute


Qualifiers:  


   Pneumonia type:  due to unspecified organism  Lung location:  lower lobe of 

lung  Qualified Codes:  J18.1 - Lobar pneumonia, unspecified organism


(6) Hypokalemia


Status:  Acute





Clinical Quality Measures


DVT/VTE Risk/Contraindication:


Risk Factor Score Per Nursin


RFS Level Per Nursing on Admit:  1=Low/No VTE PPX











VALERIE BRADSHAW MD              Jul 10, 2020 11:38

## 2020-07-11 VITALS — DIASTOLIC BLOOD PRESSURE: 80 MMHG | SYSTOLIC BLOOD PRESSURE: 120 MMHG

## 2020-07-11 VITALS — DIASTOLIC BLOOD PRESSURE: 80 MMHG | SYSTOLIC BLOOD PRESSURE: 130 MMHG

## 2020-07-11 VITALS — SYSTOLIC BLOOD PRESSURE: 122 MMHG | DIASTOLIC BLOOD PRESSURE: 78 MMHG

## 2020-07-11 VITALS — DIASTOLIC BLOOD PRESSURE: 85 MMHG | SYSTOLIC BLOOD PRESSURE: 128 MMHG

## 2020-07-11 VITALS — SYSTOLIC BLOOD PRESSURE: 112 MMHG | DIASTOLIC BLOOD PRESSURE: 66 MMHG

## 2020-07-11 VITALS — DIASTOLIC BLOOD PRESSURE: 81 MMHG | SYSTOLIC BLOOD PRESSURE: 128 MMHG

## 2020-07-11 LAB
ALBUMIN SERPL-MCNC: 3.6 GM/DL (ref 3.2–4.5)
ALP SERPL-CCNC: 80 U/L (ref 40–136)
ALT SERPL-CCNC: 24 U/L (ref 0–55)
BASOPHILS # BLD AUTO: 0 10^3/UL (ref 0–0.1)
BASOPHILS NFR BLD AUTO: 0 % (ref 0–10)
BILIRUB SERPL-MCNC: 0.2 MG/DL (ref 0.1–1)
BUN/CREAT SERPL: 12
CALCIUM SERPL-MCNC: 8.9 MG/DL (ref 8.5–10.1)
CHLORIDE SERPL-SCNC: 108 MMOL/L (ref 98–107)
CO2 SERPL-SCNC: 18 MMOL/L (ref 21–32)
CREAT SERPL-MCNC: 0.65 MG/DL (ref 0.6–1.3)
EOSINOPHIL # BLD AUTO: 0 10^3/UL (ref 0–0.3)
EOSINOPHIL NFR BLD AUTO: 0 % (ref 0–10)
ERYTHROCYTE [DISTWIDTH] IN BLOOD BY AUTOMATED COUNT: 14.4 % (ref 10–14.5)
GFR SERPLBLD BASED ON 1.73 SQ M-ARVRAT: > 60 ML/MIN
GLUCOSE SERPL-MCNC: 256 MG/DL (ref 70–105)
HCT VFR BLD CALC: 31 % (ref 35–52)
HGB BLD-MCNC: 10.4 G/DL (ref 11.5–16)
LYMPHOCYTES # BLD AUTO: 0.6 X 10^3 (ref 1–4)
LYMPHOCYTES NFR BLD AUTO: 18 % (ref 12–44)
MANUAL DIFFERENTIAL PERFORMED BLD QL: NO
MCH RBC QN AUTO: 27 PG (ref 25–34)
MCHC RBC AUTO-ENTMCNC: 34 G/DL (ref 32–36)
MCV RBC AUTO: 81 FL (ref 80–99)
MONOCYTES # BLD AUTO: 0.3 X 10^3 (ref 0–1)
MONOCYTES NFR BLD AUTO: 10 % (ref 0–12)
NEUTROPHILS # BLD AUTO: 2.4 X 10^3 (ref 1.8–7.8)
NEUTROPHILS NFR BLD AUTO: 72 % (ref 42–75)
PLATELET # BLD: 252 10^3/UL (ref 130–400)
PMV BLD AUTO: 10.1 FL (ref 7.4–10.4)
POTASSIUM SERPL-SCNC: 3.9 MMOL/L (ref 3.6–5)
PROT SERPL-MCNC: 7 GM/DL (ref 6.4–8.2)
SODIUM SERPL-SCNC: 138 MMOL/L (ref 135–145)
WBC # BLD AUTO: 3.3 10^3/UL (ref 4.3–11)

## 2020-07-11 RX ADMIN — ACETAMINOPHEN PRN MG: 500 TABLET ORAL at 10:58

## 2020-07-11 RX ADMIN — INSULIN ASPART SCH UNIT: 100 INJECTION, SOLUTION INTRAVENOUS; SUBCUTANEOUS at 17:56

## 2020-07-11 RX ADMIN — ACETAMINOPHEN PRN MG: 500 TABLET ORAL at 20:01

## 2020-07-11 RX ADMIN — ENOXAPARIN SODIUM SCH MG: 100 INJECTION SUBCUTANEOUS at 10:58

## 2020-07-11 RX ADMIN — INSULIN ASPART SCH UNIT: 100 INJECTION, SOLUTION INTRAVENOUS; SUBCUTANEOUS at 12:00

## 2020-07-11 RX ADMIN — ALBUTEROL SULFATE SCH PUFF: 90 AEROSOL, METERED RESPIRATORY (INHALATION) at 19:32

## 2020-07-11 RX ADMIN — ONDANSETRON SCH MG: 2 INJECTION, SOLUTION INTRAMUSCULAR; INTRAVENOUS at 08:32

## 2020-07-11 RX ADMIN — SODIUM CHLORIDE SCH MLS/HR: 900 INJECTION, SOLUTION INTRAVENOUS at 10:58

## 2020-07-11 RX ADMIN — ALBUTEROL SULFATE SCH PUFF: 90 AEROSOL, METERED RESPIRATORY (INHALATION) at 02:46

## 2020-07-11 RX ADMIN — Medication SCH ML: at 20:01

## 2020-07-11 RX ADMIN — ALBUTEROL SULFATE SCH PUFF: 90 AEROSOL, METERED RESPIRATORY (INHALATION) at 07:00

## 2020-07-11 RX ADMIN — INSULIN ASPART SCH UNIT: 100 INJECTION, SOLUTION INTRAVENOUS; SUBCUTANEOUS at 06:31

## 2020-07-11 RX ADMIN — INSULIN ASPART SCH UNIT: 100 INJECTION, SOLUTION INTRAVENOUS; SUBCUTANEOUS at 20:01

## 2020-07-11 RX ADMIN — Medication SCH ML: at 06:31

## 2020-07-11 RX ADMIN — ALBUTEROL SULFATE SCH PUFF: 90 AEROSOL, METERED RESPIRATORY (INHALATION) at 14:42

## 2020-07-11 RX ADMIN — POTASSIUM CHLORIDE SCH MLS/HR: 200 INJECTION, SOLUTION INTRAVENOUS at 05:53

## 2020-07-11 RX ADMIN — ALBUTEROL SULFATE SCH PUFF: 90 AEROSOL, METERED RESPIRATORY (INHALATION) at 09:25

## 2020-07-11 RX ADMIN — POTASSIUM CHLORIDE SCH MEQ: 1500 TABLET, EXTENDED RELEASE ORAL at 05:54

## 2020-07-11 RX ADMIN — Medication SCH ML: at 13:10

## 2020-07-11 RX ADMIN — MAGNESIUM SULFATE IN DEXTROSE SCH MLS/HR: 10 INJECTION, SOLUTION INTRAVENOUS at 05:53

## 2020-07-11 RX ADMIN — ALBUTEROL SULFATE SCH PUFF: 90 AEROSOL, METERED RESPIRATORY (INHALATION) at 22:35

## 2020-07-11 NOTE — PROGRESS NOTE - HOSPITALIST
Subjective


HPI/CC On Admission


Date Seen by Provider:  2020


Time Seen by Provider:  10:20


Saida Catalan is a 34-year-old female with past medical history of 

prediabetes and obesity who presented with shortness of breath.  She says that 

her symptoms started on .  She reports having fevers.  She reports a dry 

cough.  She reports having some pleuritic chest pain.  She denies any abdominal 

pain, nausea, vomiting, or diarrhea.  She denies tobacco use.  Her only 

medication is metformin.


Subjective/Events-last exam


she continues to feel short of breath.  She continues to have a cough.  She de

nies any fevers.  She has pleuritic chest pain in the center of her chest.  She 

denies any abdominal pain, nausea, or vomiting.  She has been eating and 

drinking without issue.  She had oatmeal this morning.





Focused Exam


Lactate Level


20 11:40: Lactic Acid Level 1.34








Objective


Exam


Vital Signs





Vital Signs








  Date Time  Temp Pulse Resp B/P (MAP) Pulse Ox O2 Delivery O2 Flow Rate FiO2


 


20 08:28 37.4 90 25 112/66 (81) 91 Nasal Cannula 1.00 


 


20 15:45        21





Capillary Refill : Less Than 3 SecondsLess Than 3 Seconds


General Appearance:  No Apparent Distress, WD/WN


HEENT:  PERRL/EOMI, Pharynx Normal


Neck:  Normal Inspection, Supple


Respiratory:  Lungs Clear, Normal Breath Sounds, No Respiratory Distress


Cardiovascular:  Regular Rate, Rhythm, No Edema, No Murmur


Gastrointestinal:  Normal Bowel Sounds, Non Tender, Soft


Extremity:  Normal Inspection, Non Tender, No Pedal Edema


Neurologic/Psychiatric:  Alert, Oriented x3, No Motor/Sensory Deficits, Normal 

Mood/Affect


Skin:  Normal Color, Warm/Dry





Results/Procedures


Lab


Laboratory Tests


7/10/20 12:20








20 05:10








Patient resulted labs reviewed.


Imaging:  Reviewed Imaging Report





Assessment/Plan


Assessment and Plan


Assess & Plan/Chief Complaint


COVID-19


Acute respiratory failure with hypoxia


Bilateral pneumonia


COVID PCR positive


continue Remdesivir and Decadron


continue oxygen supplementation, wean as able





Hypokalemia


Continue to monitor and replace as needed





Prediabetes


Sliding scale insulin





Obesity


Clinically significant, no acute management needs





DVT prophylaxis: Lovenox





Diagnosis/Problems


Diagnosis/Problems





(1) COVID-19


Status:  Acute


(2) Acute respiratory failure with hypoxia


Status:  Acute


(3) Prediabetes


Status:  Chronic


(4) Obesity


Status:  Chronic


(5) Bilateral pneumonia


Status:  Acute


Qualifiers:  


   Pneumonia type:  due to unspecified organism  Lung location:  lower lobe of 

lung  Qualified Codes:  J18.1 - Lobar pneumonia, unspecified organism


(6) Hypokalemia


Status:  Acute





Clinical Quality Measures


DVT/VTE Risk/Contraindication:


Risk Factor Score Per Nursin


RFS Level Per Nursing on Admit:  1=Low/No VTE PPX











LENIN BRADSHAW MD              2020 11:31

## 2020-07-12 VITALS — DIASTOLIC BLOOD PRESSURE: 78 MMHG | SYSTOLIC BLOOD PRESSURE: 114 MMHG

## 2020-07-12 VITALS — SYSTOLIC BLOOD PRESSURE: 129 MMHG | DIASTOLIC BLOOD PRESSURE: 60 MMHG

## 2020-07-12 VITALS — DIASTOLIC BLOOD PRESSURE: 68 MMHG | SYSTOLIC BLOOD PRESSURE: 124 MMHG

## 2020-07-12 VITALS — SYSTOLIC BLOOD PRESSURE: 132 MMHG | DIASTOLIC BLOOD PRESSURE: 78 MMHG

## 2020-07-12 VITALS — SYSTOLIC BLOOD PRESSURE: 128 MMHG | DIASTOLIC BLOOD PRESSURE: 82 MMHG

## 2020-07-12 VITALS — SYSTOLIC BLOOD PRESSURE: 120 MMHG | DIASTOLIC BLOOD PRESSURE: 78 MMHG

## 2020-07-12 LAB
ALBUMIN SERPL-MCNC: 3 GM/DL (ref 3.2–4.5)
ALP SERPL-CCNC: 63 U/L (ref 40–136)
ALT SERPL-CCNC: 17 U/L (ref 0–55)
BILIRUB SERPL-MCNC: 0.2 MG/DL (ref 0.1–1)
BUN/CREAT SERPL: 20
CALCIUM SERPL-MCNC: 7.6 MG/DL (ref 8.5–10.1)
CHLORIDE SERPL-SCNC: 111 MMOL/L (ref 98–107)
CO2 SERPL-SCNC: 16 MMOL/L (ref 21–32)
CREAT SERPL-MCNC: 0.56 MG/DL (ref 0.6–1.3)
GFR SERPLBLD BASED ON 1.73 SQ M-ARVRAT: > 60 ML/MIN
GLUCOSE SERPL-MCNC: 252 MG/DL (ref 70–105)
POTASSIUM SERPL-SCNC: 3.7 MMOL/L (ref 3.6–5)
PROT SERPL-MCNC: 5.9 GM/DL (ref 6.4–8.2)
SODIUM SERPL-SCNC: 139 MMOL/L (ref 135–145)

## 2020-07-12 RX ADMIN — ONDANSETRON SCH MG: 2 INJECTION, SOLUTION INTRAMUSCULAR; INTRAVENOUS at 08:03

## 2020-07-12 RX ADMIN — INSULIN ASPART SCH UNIT: 100 INJECTION, SOLUTION INTRAVENOUS; SUBCUTANEOUS at 21:09

## 2020-07-12 RX ADMIN — ONDANSETRON PRN MG: 2 INJECTION, SOLUTION INTRAMUSCULAR; INTRAVENOUS at 08:12

## 2020-07-12 RX ADMIN — Medication SCH ML: at 21:05

## 2020-07-12 RX ADMIN — INSULIN ASPART SCH UNIT: 100 INJECTION, SOLUTION INTRAVENOUS; SUBCUTANEOUS at 18:05

## 2020-07-12 RX ADMIN — ALBUTEROL SULFATE SCH PUFF: 90 AEROSOL, METERED RESPIRATORY (INHALATION) at 19:37

## 2020-07-12 RX ADMIN — ENOXAPARIN SODIUM SCH MG: 100 INJECTION SUBCUTANEOUS at 11:26

## 2020-07-12 RX ADMIN — SODIUM CHLORIDE SCH MLS/HR: 900 INJECTION, SOLUTION INTRAVENOUS at 08:03

## 2020-07-12 RX ADMIN — ALBUTEROL SULFATE SCH PUFF: 90 AEROSOL, METERED RESPIRATORY (INHALATION) at 14:56

## 2020-07-12 RX ADMIN — POTASSIUM CHLORIDE SCH MEQ: 1500 TABLET, EXTENDED RELEASE ORAL at 06:00

## 2020-07-12 RX ADMIN — ALBUTEROL SULFATE SCH PUFF: 90 AEROSOL, METERED RESPIRATORY (INHALATION) at 10:45

## 2020-07-12 RX ADMIN — MAGNESIUM SULFATE IN DEXTROSE SCH MLS/HR: 10 INJECTION, SOLUTION INTRAVENOUS at 06:00

## 2020-07-12 RX ADMIN — ALBUTEROL SULFATE SCH PUFF: 90 AEROSOL, METERED RESPIRATORY (INHALATION) at 04:41

## 2020-07-12 RX ADMIN — ALBUTEROL SULFATE SCH PUFF: 90 AEROSOL, METERED RESPIRATORY (INHALATION) at 22:00

## 2020-07-12 RX ADMIN — INSULIN ASPART SCH UNIT: 100 INJECTION, SOLUTION INTRAVENOUS; SUBCUTANEOUS at 04:38

## 2020-07-12 RX ADMIN — ALBUTEROL SULFATE SCH PUFF: 90 AEROSOL, METERED RESPIRATORY (INHALATION) at 02:38

## 2020-07-12 RX ADMIN — Medication SCH ML: at 04:38

## 2020-07-12 RX ADMIN — ACETAMINOPHEN PRN MG: 500 TABLET ORAL at 17:15

## 2020-07-12 RX ADMIN — Medication SCH ML: at 12:38

## 2020-07-12 RX ADMIN — INSULIN ASPART SCH UNIT: 100 INJECTION, SOLUTION INTRAVENOUS; SUBCUTANEOUS at 17:15

## 2020-07-12 RX ADMIN — POTASSIUM CHLORIDE SCH MLS/HR: 200 INJECTION, SOLUTION INTRAVENOUS at 06:00

## 2020-07-12 RX ADMIN — INSULIN ASPART SCH UNIT: 100 INJECTION, SOLUTION INTRAVENOUS; SUBCUTANEOUS at 13:19

## 2020-07-12 NOTE — PROGRESS NOTE - HOSPITALIST
Subjective


HPI/CC On Admission


Date Seen by Provider:  2020


Time Seen by Provider:  10:30


Saida Catalan is a 34-year-old female with past medical history of 

prediabetes and obesity who presented with shortness of breath.  She says that 

her symptoms started on .  She reports having fevers.  She reports a dry 

cough.  She reports having some pleuritic chest pain.  She denies any abdominal 

pain, nausea, vomiting, or diarrhea.  She denies tobacco use.  Her only 

medication is metformin.


Subjective/Events-last exam


She continues to have shortness of breath and cough.  She denies any fevers and 

chills.  She denies any nausea and vomiting.  She is still requiring 

supplemental oxygen.  She is sitting in her bedside chair.  She has been eating 

and drinking without issue.





Objective


Exam


Vital Signs





Vital Signs








  Date Time  Temp Pulse Resp B/P (MAP) Pulse Ox O2 Delivery O2 Flow Rate FiO2


 


20 10:45     93 Nasal Cannula 2.00 


 


20 08:01 36.7 83 18 120/78 (92)    


 


20 15:45        21





Capillary Refill : Less Than 3 SecondsLess Than 3 Seconds


General Appearance:  No Apparent Distress, Obese


Respiratory:  Lungs Clear, Normal Breath Sounds, No Respiratory Distress


Cardiovascular:  Regular Rate, Rhythm, No Edema, No Murmur


Gastrointestinal:  Normal Bowel Sounds, Non Tender, Soft


Extremity:  Normal Inspection, Non Tender, No Pedal Edema


Neurologic/Psychiatric:  Alert, Oriented x3, No Motor/Sensory Deficits, Normal 

Mood/Affect


Skin:  Normal Color, Warm/Dry





Results/Procedures


Lab


Laboratory Tests


20 04:30








Patient resulted labs reviewed.


Imaging:  Reviewed Imaging Report





Assessment/Plan


Assessment and Plan


Assess & Plan/Chief Complaint


COVID-19


Acute respiratory failure with hypoxia


Bilateral pneumonia


COVID PCR positive


continue Remdesivir and Decadron


continue oxygen supplementation, wean as able





Prediabetes


Steroid-induced hyperglycemia


Sliding scale insulin


Add Levemir 10 units nightly





Obesity


Clinically significant, no acute management needs





DVT prophylaxis: Lovenox





Hypokalemia, resolved





Diagnosis/Problems


Diagnosis/Problems





(1) COVID-19


Status:  Acute


(2) Acute respiratory failure with hypoxia


Status:  Acute


(3) Prediabetes


Status:  Chronic


(4) Obesity


Status:  Chronic


(5) Bilateral pneumonia


Status:  Acute


Qualifiers:  


   Pneumonia type:  due to unspecified organism  Lung location:  lower lobe of 

lung  Qualified Codes:  J18.1 - Lobar pneumonia, unspecified organism


(6) Hypokalemia


Status:  Resolved


Resolution Date/Time:  20 @ 12:59


(7) Steroid-induced hyperglycemia


Status:  Acute





Clinical Quality Measures


DVT/VTE Risk/Contraindication:


Risk Factor Score Per Nursin


RFS Level Per Nursing on Admit:  1=Low/No VTE PPX











LENIN BRADSHAW MD              2020 12:59

## 2020-07-13 VITALS — DIASTOLIC BLOOD PRESSURE: 80 MMHG | SYSTOLIC BLOOD PRESSURE: 122 MMHG

## 2020-07-13 VITALS — SYSTOLIC BLOOD PRESSURE: 122 MMHG | DIASTOLIC BLOOD PRESSURE: 80 MMHG

## 2020-07-13 VITALS — DIASTOLIC BLOOD PRESSURE: 74 MMHG | SYSTOLIC BLOOD PRESSURE: 104 MMHG

## 2020-07-13 VITALS — DIASTOLIC BLOOD PRESSURE: 75 MMHG | SYSTOLIC BLOOD PRESSURE: 118 MMHG

## 2020-07-13 VITALS — SYSTOLIC BLOOD PRESSURE: 126 MMHG | DIASTOLIC BLOOD PRESSURE: 79 MMHG

## 2020-07-13 VITALS — SYSTOLIC BLOOD PRESSURE: 114 MMHG | DIASTOLIC BLOOD PRESSURE: 76 MMHG

## 2020-07-13 VITALS — DIASTOLIC BLOOD PRESSURE: 61 MMHG | SYSTOLIC BLOOD PRESSURE: 114 MMHG

## 2020-07-13 LAB
ALBUMIN SERPL-MCNC: 3.6 GM/DL (ref 3.2–4.5)
ALP SERPL-CCNC: 73 U/L (ref 40–136)
ALT SERPL-CCNC: 20 U/L (ref 0–55)
BILIRUB SERPL-MCNC: 0.3 MG/DL (ref 0.1–1)
BUN/CREAT SERPL: 21
CALCIUM SERPL-MCNC: 8.9 MG/DL (ref 8.5–10.1)
CHLORIDE SERPL-SCNC: 104 MMOL/L (ref 98–107)
CO2 SERPL-SCNC: 21 MMOL/L (ref 21–32)
CREAT SERPL-MCNC: 0.78 MG/DL (ref 0.6–1.3)
GFR SERPLBLD BASED ON 1.73 SQ M-ARVRAT: > 60 ML/MIN
GLUCOSE SERPL-MCNC: 249 MG/DL (ref 70–105)
POTASSIUM SERPL-SCNC: 4.3 MMOL/L (ref 3.6–5)
PROT SERPL-MCNC: 6.7 GM/DL (ref 6.4–8.2)
SODIUM SERPL-SCNC: 137 MMOL/L (ref 135–145)

## 2020-07-13 RX ADMIN — INSULIN ASPART SCH UNIT: 100 INJECTION, SOLUTION INTRAVENOUS; SUBCUTANEOUS at 20:42

## 2020-07-13 RX ADMIN — ONDANSETRON SCH MG: 2 INJECTION, SOLUTION INTRAMUSCULAR; INTRAVENOUS at 07:29

## 2020-07-13 RX ADMIN — ALBUTEROL SULFATE SCH PUFF: 90 AEROSOL, METERED RESPIRATORY (INHALATION) at 07:42

## 2020-07-13 RX ADMIN — INSULIN ASPART SCH UNIT: 100 INJECTION, SOLUTION INTRAVENOUS; SUBCUTANEOUS at 10:07

## 2020-07-13 RX ADMIN — ENOXAPARIN SODIUM SCH MG: 100 INJECTION SUBCUTANEOUS at 10:07

## 2020-07-13 RX ADMIN — Medication SCH ML: at 06:36

## 2020-07-13 RX ADMIN — ALBUTEROL SULFATE SCH PUFF: 90 AEROSOL, METERED RESPIRATORY (INHALATION) at 22:22

## 2020-07-13 RX ADMIN — POTASSIUM CHLORIDE SCH MLS/HR: 200 INJECTION, SOLUTION INTRAVENOUS at 06:20

## 2020-07-13 RX ADMIN — ALBUTEROL SULFATE SCH PUFF: 90 AEROSOL, METERED RESPIRATORY (INHALATION) at 14:06

## 2020-07-13 RX ADMIN — Medication SCH ML: at 20:42

## 2020-07-13 RX ADMIN — POTASSIUM CHLORIDE SCH MEQ: 1500 TABLET, EXTENDED RELEASE ORAL at 06:21

## 2020-07-13 RX ADMIN — SODIUM CHLORIDE SCH MLS/HR: 900 INJECTION, SOLUTION INTRAVENOUS at 10:35

## 2020-07-13 RX ADMIN — ALBUTEROL SULFATE SCH PUFF: 90 AEROSOL, METERED RESPIRATORY (INHALATION) at 02:40

## 2020-07-13 RX ADMIN — MAGNESIUM SULFATE IN DEXTROSE SCH MLS/HR: 10 INJECTION, SOLUTION INTRAVENOUS at 06:20

## 2020-07-13 RX ADMIN — INSULIN ASPART SCH UNIT: 100 INJECTION, SOLUTION INTRAVENOUS; SUBCUTANEOUS at 06:36

## 2020-07-13 RX ADMIN — INSULIN ASPART SCH UNIT: 100 INJECTION, SOLUTION INTRAVENOUS; SUBCUTANEOUS at 15:07

## 2020-07-13 RX ADMIN — Medication SCH ML: at 14:35

## 2020-07-13 NOTE — PROGRESS NOTE - HOSPITALIST
Subjective


HPI/CC On Admission


Date Seen by Provider:  2020


Time Seen by Provider:  14:13


Saida Catalan is a 34-year-old female with past medical history of 

prediabetes and obesity who presented with shortness of breath.  She says that 

her symptoms started on .  She reports having fevers.  She reports a dry 

cough.  She reports having some pleuritic chest pain.  She denies any abdominal 

pain, nausea, vomiting, or diarrhea.  She denies tobacco use.  Her only 

medication is metformin.


Subjective/Events-last exam


Pt reports feeling better today. Breathing improving. Less shortness of breath.





Objective


Exam


Vital Signs





Vital Signs








  Date Time  Temp Pulse Resp B/P (MAP) Pulse Ox O2 Delivery O2 Flow Rate FiO2


 


20 14:06     93 Nasal Cannula 2.00 


 


20 13:38 36.4 87      28


 


20 12:00   20 122/80 (94)    





Capillary Refill : Less Than 3 SecondsLess Than 3 Seconds


General Appearance:  No Apparent Distress, WD/WN


Respiratory:  Lungs Clear, No Respiratory Distress


Cardiovascular:  Regular Rate, Rhythm, No Murmur


Neurologic/Psychiatric:  Alert, Oriented x3





Results/Procedures


Lab


Laboratory Tests


20 05:15








Patient resulted labs reviewed.


Imaging:  Reviewed Imaging Report





Assessment/Plan


Assessment and Plan


Assess & Plan/Chief Complaint


COVID-19


Acute respiratory failure with hypoxia


Bilateral pneumonia


COVID PCR positive


continue Remdesivir day 4/5 and Decadron


continue oxygen supplementation, wean as able





Prediabetes


Steroid-induced hyperglycemia


Sliding scale insulin


Continue Levemir 10 units nightly





Obesity


Clinically significant, no acute management needs





DVT prophylaxis: Lovenox





Hypokalemia, resolved





Diagnosis/Problems


Diagnosis/Problems





(1) COVID-19


Status:  Acute


(2) Hypokalemia


Status:  Resolved


Resolution Date/Time:  20 @ 12:59


(3) Steroid-induced hyperglycemia


Status:  Acute


(4) Acute respiratory failure with hypoxia


Status:  Acute


(5) Prediabetes


Status:  Chronic


(6) Obesity


Status:  Chronic


Qualifiers:  


   Body mass index:  BMI 34.0-34.9  





Clinical Quality Measures


DVT/VTE Risk/Contraindication:


Risk Factor Score Per Nursin


RFS Level Per Nursing on Admit:  1=Low/No VTE PPX











CHAVA DASILVA MD              2020 14:17

## 2020-07-14 VITALS — DIASTOLIC BLOOD PRESSURE: 77 MMHG | SYSTOLIC BLOOD PRESSURE: 128 MMHG

## 2020-07-14 VITALS — SYSTOLIC BLOOD PRESSURE: 116 MMHG | DIASTOLIC BLOOD PRESSURE: 63 MMHG

## 2020-07-14 VITALS — SYSTOLIC BLOOD PRESSURE: 120 MMHG | DIASTOLIC BLOOD PRESSURE: 56 MMHG

## 2020-07-14 VITALS — SYSTOLIC BLOOD PRESSURE: 110 MMHG | DIASTOLIC BLOOD PRESSURE: 74 MMHG

## 2020-07-14 LAB
ALBUMIN SERPL-MCNC: 3.4 GM/DL (ref 3.2–4.5)
ALP SERPL-CCNC: 68 U/L (ref 40–136)
ALT SERPL-CCNC: 18 U/L (ref 0–55)
BILIRUB SERPL-MCNC: 0.3 MG/DL (ref 0.1–1)
BUN/CREAT SERPL: 23
CALCIUM SERPL-MCNC: 8.6 MG/DL (ref 8.5–10.1)
CHLORIDE SERPL-SCNC: 107 MMOL/L (ref 98–107)
CO2 SERPL-SCNC: 17 MMOL/L (ref 21–32)
CREAT SERPL-MCNC: 0.7 MG/DL (ref 0.6–1.3)
GFR SERPLBLD BASED ON 1.73 SQ M-ARVRAT: > 60 ML/MIN
GLUCOSE SERPL-MCNC: 176 MG/DL (ref 70–105)
MAGNESIUM SERPL-MCNC: 2.1 MG/DL (ref 1.6–2.4)
POTASSIUM SERPL-SCNC: 4.2 MMOL/L (ref 3.6–5)
PROT SERPL-MCNC: 6.4 GM/DL (ref 6.4–8.2)
SODIUM SERPL-SCNC: 136 MMOL/L (ref 135–145)

## 2020-07-14 RX ADMIN — ALBUTEROL SULFATE SCH PUFF: 90 AEROSOL, METERED RESPIRATORY (INHALATION) at 03:18

## 2020-07-14 RX ADMIN — ENOXAPARIN SODIUM SCH MG: 100 INJECTION SUBCUTANEOUS at 12:31

## 2020-07-14 RX ADMIN — MAGNESIUM SULFATE IN DEXTROSE SCH MLS/HR: 10 INJECTION, SOLUTION INTRAVENOUS at 06:15

## 2020-07-14 RX ADMIN — ALBUTEROL SULFATE SCH PUFF: 90 AEROSOL, METERED RESPIRATORY (INHALATION) at 07:33

## 2020-07-14 RX ADMIN — ACETAMINOPHEN PRN MG: 500 TABLET ORAL at 09:50

## 2020-07-14 RX ADMIN — Medication SCH ML: at 06:15

## 2020-07-14 RX ADMIN — INSULIN ASPART SCH UNIT: 100 INJECTION, SOLUTION INTRAVENOUS; SUBCUTANEOUS at 06:16

## 2020-07-14 RX ADMIN — INSULIN ASPART SCH UNIT: 100 INJECTION, SOLUTION INTRAVENOUS; SUBCUTANEOUS at 15:10

## 2020-07-14 RX ADMIN — INSULIN ASPART SCH UNIT: 100 INJECTION, SOLUTION INTRAVENOUS; SUBCUTANEOUS at 09:53

## 2020-07-14 RX ADMIN — POTASSIUM CHLORIDE SCH MEQ: 1500 TABLET, EXTENDED RELEASE ORAL at 06:15

## 2020-07-14 RX ADMIN — ALBUTEROL SULFATE SCH PUFF: 90 AEROSOL, METERED RESPIRATORY (INHALATION) at 15:05

## 2020-07-14 RX ADMIN — ONDANSETRON SCH MG: 2 INJECTION, SOLUTION INTRAMUSCULAR; INTRAVENOUS at 07:32

## 2020-07-14 RX ADMIN — SODIUM CHLORIDE SCH MLS/HR: 900 INJECTION, SOLUTION INTRAVENOUS at 12:31

## 2020-07-14 RX ADMIN — POTASSIUM CHLORIDE SCH MLS/HR: 200 INJECTION, SOLUTION INTRAVENOUS at 06:15

## 2020-07-14 NOTE — DISCHARGE INST-SIMPLE/STANDARD
Discharge Inst-Standard


Reconcile Patient Problems


Problems Reviewed?:  Yes





Patient Instructions/Follow Up


Plan of Care/Instructions/FU:  


Please continue to take your medications as written. Please followup with


your primary care doctor within in the next week to follow up your


hospital stay.


Activity as Tolerated:  Yes


Discharge Diet:  ADA Diet


Return to The Hospital For:  


Chest pain, shortness of breath, fever, if you feel you are getting worse.











CHAVA DASILVA MD              Jul 14, 2020 13:04

## 2020-07-14 NOTE — NUR
pts saturation was 95% on RA. pt began walking. pt did not desaturate in the 6 minutes of 
walking. Pt o2 saturation ended on 90% after 6 minutes of walking. pt does not qualify for 
O2 at home. 

-------------------------------------------------------------------------------

Addendum: 07/14/20 at 1648 by DB DEWITT RT

-------------------------------------------------------------------------------

Amended: Links added.

## 2020-07-14 NOTE — DISCHARGE SUMMARY
Diagnosis/Chief Complaint


Date of Admission


2020 at 12:39


Date of Discharge





Discharge Date:  2020


Admission Diagnosis


Acute respiratory failure with hypoxia


Primary Care


Center/[facility]


Discharge Diagnosis





(1) Bilateral pneumonia


Status:  Acute


(2) Hypokalemia


Status:  Resolved


(3) Steroid-induced hyperglycemia


Status:  Acute


(4) Acute respiratory failure with hypoxia


Status:  Acute


(5) Prediabetes


Status:  Chronic


(6) Obesity


Status:  Chronic





Discharge Summary


Discharge Physical Exam


Allergies:  


Coded Allergies:  


     NKANo Known Allergies (Verified  Allergy, Unknown, 06)


Vitals & I&Os





Vital Signs








  Date Time  Temp Pulse Resp B/P (MAP) Pulse Ox O2 Delivery O2 Flow Rate FiO2


 


20 12:30 36.7 72 24 128/77 (94) 94 Room Air  


 


20 04:00       2.00 


 


20 13:38        28








General Appearance:  No Apparent Distress, WD/WN


Respiratory:  Lungs Clear, No Respiratory Distress


Cardiovascular:  Regular Rate, Rhythm, No Murmur


Gastrointestinal:  Normal Bowel Sounds, Soft


Neurologic/Psychiatric:  Alert, Oriented x3





Hospital Course


Pt was admitted due hypoxic respiratory failure from COVID19. She was treated 

with decadron and Remdesivir and did very well. She was titrated off oxygen and 

did well. She was discharged home in stable condition to follow up with her 

primary care doctor in the next week.


Labs (last 24 hrs)


Laboratory Tests


20 15:05: Glucometer 337H


20 20:36: Glucometer 384H


20 05:30: 


Sodium Level 136, Potassium Level 4.2, Chloride Level 107, Carbon Dioxide Level 

17L, Anion Gap 12, Blood Urea Nitrogen 16, Creatinine 0.70, Estimat Glomerular 

Filtration Rate > 60, BUN/Creatinine Ratio 23, Glucose Level 176H, Calcium Level

8.6, Corrected Calcium 9.1, Magnesium Level 2.1, Total Bilirubin 0.3, Aspartate 

Amino Transf (AST/SGOT) 11, Alanine Aminotransferase (ALT/SGPT) 18, Alkaline 

Phosphatase 68, Total Protein 6.4, Albumin 3.4


20 09:50: Glucometer 213H





Microbiology


20 Blood Culture - Preliminary, Resulted


         Staph, Coag Neg (CNS)


Patient resulted labs reviewed.


Pending Labs


Laboratory Tests


20 05:30: 


Sodium Level 136, Potassium Level 4.2, Chloride Level 107, Carbon Dioxide Level 

17, Anion Gap 12, Blood Urea Nitrogen 16, Creatinine 0.70, Estimat Glomerular 

Filtration Rate > 60, BUN/Creatinine Ratio 23, Glucose Level 176, Calcium Level 

8.6, Corrected Calcium 9.1, Magnesium Level 2.1, Total Bilirubin 0.3, Aspartate 

Amino Transf (AST/SGOT) 11, Alanine Aminotransferase (ALT/SGPT) 18, Alkaline 

Phosphatase 68, Total Protein 6.4, Albumin 3.4


20 09:50: Glucometer 213





Imaging:  Reviewed Imaging Report





Discussion & Recommendations


Discharge Planning:  >30 minutes discharge planning





Discharge


Home Medications:





Active Scripts


Active


Reported


Metformin HCl 500 Mg Tablet 500 Mg PO BID WITH MEALS





Instructions to patient/family


Please see electronic discharge instructions given to patient.





Clinical Quality Measures


DVT/VTE Risk/Contraindication:


Risk Factor Score Per Nursin


RFS Level Per Nursing on Admit:  1=Low/No VTE PPX





Problem Qualifiers





(1) Obesity:  


Body mass index:  BMI 34.0-34.9








CHAVA DASILVA MD              2020 13:13

## 2021-05-12 ENCOUNTER — HOSPITAL ENCOUNTER (OUTPATIENT)
Dept: HOSPITAL 75 - RT | Age: 36
End: 2021-05-12
Attending: NURSE PRACTITIONER
Payer: COMMERCIAL

## 2021-05-12 DIAGNOSIS — Z86.16: ICD-10-CM

## 2021-05-12 DIAGNOSIS — J45.20: Primary | ICD-10-CM

## 2021-05-12 PROCEDURE — 94060 EVALUATION OF WHEEZING: CPT

## 2021-05-12 PROCEDURE — 94729 DIFFUSING CAPACITY: CPT

## 2021-05-12 PROCEDURE — 94726 PLETHYSMOGRAPHY LUNG VOLUMES: CPT
